# Patient Record
Sex: FEMALE | Race: WHITE | HISPANIC OR LATINO | Employment: FULL TIME | URBAN - METROPOLITAN AREA
[De-identification: names, ages, dates, MRNs, and addresses within clinical notes are randomized per-mention and may not be internally consistent; named-entity substitution may affect disease eponyms.]

---

## 2018-10-09 ENCOUNTER — CONVERSION ENCOUNTER (OUTPATIENT)
Dept: MAMMOGRAPHY | Facility: HOSPITAL | Age: 53
End: 2018-10-09

## 2019-06-22 ENCOUNTER — HOSPITAL ENCOUNTER (OUTPATIENT)
Dept: OTHER | Facility: HOSPITAL | Age: 54
Discharge: HOME | End: 2019-06-22
Attending: PHYSICIAN ASSISTANT

## 2019-06-22 LAB
25(OH)D3 SERPL-MCNC: 37.8 NG/ML (ref 30–100)
ALBUMIN SERPL-MCNC: 4.1 G/DL (ref 3.5–5)
ALBUMIN/GLOB SERPL: 1.4 {RATIO} (ref 1.4–2.6)
ALP SERPL-CCNC: 78 U/L (ref 53–141)
ALT SERPL-CCNC: 20 U/L (ref 10–40)
ANION GAP SERPL CALC-SCNC: 16 MMOL/L (ref 8–19)
AST SERPL-CCNC: 15 U/L (ref 15–50)
BASOPHILS # BLD AUTO: 0.03 10*3/UL (ref 0–0.2)
BASOPHILS NFR BLD AUTO: 0.3 % (ref 0–3)
BILIRUB SERPL-MCNC: 0.6 MG/DL (ref 0.2–1.3)
BUN SERPL-MCNC: 15 MG/DL (ref 5–25)
BUN/CREAT SERPL: 21 {RATIO} (ref 6–20)
CALCIUM SERPL-MCNC: 8.8 MG/DL (ref 8.7–10.4)
CHLORIDE SERPL-SCNC: 104 MMOL/L (ref 99–111)
CHOLEST SERPL-MCNC: 131 MG/DL (ref 107–200)
CHOLEST/HDLC SERPL: 2.5 {RATIO} (ref 3–6)
CONV ABS IMM GRAN: 0.05 10*3/UL (ref 0–0.2)
CONV CO2: 23 MMOL/L (ref 22–32)
CONV IMMATURE GRAN: 0.5 % (ref 0–1.8)
CONV TOTAL PROTEIN: 7 G/DL (ref 6.3–8.2)
CREAT UR-MCNC: 0.7 MG/DL (ref 0.5–0.9)
DEPRECATED RDW RBC AUTO: 42.3 FL (ref 36.4–46.3)
EOSINOPHIL # BLD AUTO: 0 % (ref 0–7)
EOSINOPHIL # BLD AUTO: 0 10*3/UL (ref 0–0.7)
ERYTHROCYTE [DISTWIDTH] IN BLOOD BY AUTOMATED COUNT: 13.7 % (ref 11.7–14.4)
GFR SERPLBLD BASED ON 1.73 SQ M-ARVRAT: >60 ML/MIN/{1.73_M2}
GLOBULIN UR ELPH-MCNC: 2.9 G/DL (ref 2–3.5)
GLUCOSE SERPL-MCNC: 103 MG/DL (ref 65–99)
HBA1C MFR BLD: 12.7 G/DL (ref 12–16)
HCT VFR BLD AUTO: 39.3 % (ref 37–47)
HDLC SERPL-MCNC: 52 MG/DL (ref 40–60)
LDLC SERPL CALC-MCNC: 59 MG/DL (ref 70–100)
LYMPHOCYTES # BLD AUTO: 2.33 10*3/UL (ref 1–5)
MCH RBC QN AUTO: 27.3 PG (ref 27–31)
MCHC RBC AUTO-ENTMCNC: 32.3 G/DL (ref 33–37)
MCV RBC AUTO: 84.3 FL (ref 81–99)
MONOCYTES # BLD AUTO: 0.88 10*3/UL (ref 0.2–1.2)
MONOCYTES NFR BLD AUTO: 9.4 % (ref 3–10)
NEUTROPHILS # BLD AUTO: 6.05 10*3/UL (ref 2–8)
NEUTROPHILS NFR BLD AUTO: 64.9 % (ref 30–85)
NRBC CBCN: 0 % (ref 0–0.7)
OSMOLALITY SERPL CALC.SUM OF ELEC: 289 MOSM/KG (ref 273–304)
PLATELET # BLD AUTO: 260 10*3/UL (ref 130–400)
PMV BLD AUTO: 10.2 FL (ref 9.4–12.3)
POTASSIUM SERPL-SCNC: 4.2 MMOL/L (ref 3.5–5.3)
RBC # BLD AUTO: 4.66 10*6/UL (ref 4.2–5.4)
SODIUM SERPL-SCNC: 139 MMOL/L (ref 135–147)
T4 FREE SERPL-MCNC: 1.2 NG/DL (ref 0.9–1.8)
TRIGL SERPL-MCNC: 99 MG/DL (ref 40–150)
TSH SERPL-ACNC: 0.8 M[IU]/L (ref 0.27–4.2)
VARIANT LYMPHS NFR BLD MANUAL: 24.9 % (ref 20–45)
VLDLC SERPL-MCNC: 20 MG/DL (ref 5–37)
WBC # BLD AUTO: 9.34 10*3/UL (ref 4.8–10.8)

## 2019-10-15 ENCOUNTER — HOSPITAL ENCOUNTER (OUTPATIENT)
Dept: MAMMOGRAPHY | Facility: HOSPITAL | Age: 54
Discharge: HOME | End: 2019-10-15
Attending: PHYSICIAN ASSISTANT

## 2020-12-09 ENCOUNTER — HOSPITAL ENCOUNTER (OUTPATIENT)
Dept: URGENT CARE | Facility: CLINIC | Age: 55
Discharge: HOME | End: 2020-12-09
Attending: EMERGENCY MEDICINE

## 2020-12-10 LAB — SARS-COV-2 RNA SPEC QL NAA+PROBE: NOT DETECTED

## 2021-12-06 ENCOUNTER — TELEPHONE (OUTPATIENT)
Dept: INTERNAL MEDICINE | Facility: CLINIC | Age: 56
End: 2021-12-06

## 2021-12-06 NOTE — PROGRESS NOTES
Chief Complaint  Follow-up (f/u from [facility], pt has varicose veins she would liek you to look at. )  Subjective        History of Present Illness  Carine Tobin is a 56 y.o. female who presents to [facility] for follow-up of hypertension, hyperlipidemia, anxiety disorder, GERD, vitamin D deficiency, and impaired fasting glucose.  The patient is not having any medication side effects. Denies chest pain, shortness of air, abdominal pain, or change in bowel habits. Admits to mild swelling in bilateral ankles. She is complaining of varicose veins in both legs. She denies leg pain.    Medical Notes: Status post CVA uncontrolled hypertension (possible acute lacunar infarct right thalamus per MRI brain) 2014 and chronic numbness to left cheek. Multinodular thyroid gland status post biopsy revealing benign disease per Dr. Pruitt who has retired, needs thyroid ultrasound for nodules every 6 months through [facility]. Followed by Dr. Monet for Crohn's disease that is uncomplicated; colonoscopy done 2-20 17 in needs repeat in 7 to 10 years. Has seen Dr. Timmons (cardiology). History of iron deficiency anemia, zinc deficiency, impaired fasting glucose, and lymphadenopathy cervical chain and ultrasound done.    Medical History:  Diagnosis  Date  Age Comment Src. PL   Anemia        Anxiety and depression        Anxiety disorder, unspecified        Bronchitis        Bulging lumbar disc        Crohn's disease (HCC)        Crohn's disease of small intestine without complication (HCC)        Crohn's disease of small intestine without complication (HCC)        DJD (degenerative joint disease)        Essential (primary) hypertension        Eustachian tube dysfunction        Fibroma of tongue        GERD (gastroesophageal reflux disease)        Hepatic cyst        Hyperlipidemia, unspecified        Impaired fasting glucose        Insomnia        Internal hemorrhoids        Multiple  "thyroid nodules        Nephrolithiasis        Other cerebrovascular disease        Palpitations        Protrusion of lumbar intervertebral disc   L5-S1     Sinusitis        Vitamin D deficiency, unspecified            Surgical History:  Procedure  Laterality Date  Age Comment Src.    COLONOSCOPY  02/01/2017 51y      ENDOSCOPY AND COLONOSCOPY  10/16/2007 41y      HYSTERECTOMY  03/2019 53y PARTIAL     OTHER SURGICAL HISTORY    FNA OF THYROID     THYROID SURGERY                 Current Outpatient Medications:   •  amLODIPine (NORVASC) 5 MG tablet, Take 1 tablet by mouth Daily., Disp: 90 tablet, Rfl: 1  •  aspirin 81 MG EC tablet, Take 81 mg by mouth Daily., Disp: , Rfl:   •  atenolol (TENORMIN) 25 MG tablet, Take 1 tablet by mouth Daily., Disp: 90 tablet, Rfl: 1  •  atorvastatin (LIPITOR) 40 MG tablet, , Disp: , Rfl:   •  buPROPion XL (WELLBUTRIN XL) 150 MG 24 hr tablet, Take 1 tablet by mouth Daily., Disp: 90 tablet, Rfl: 1  •  CALCIUM CARBONATE-VIT D-MIN PO, Take 1 tablet by mouth Daily., Disp: , Rfl:   •  multivitamin with minerals (Vitamins/Minerals) tablet tablet, Take 1 tablet by mouth Daily., Disp: , Rfl:   •  olmesartan-hydrochlorothiazide (BENICAR HCT) 40-25 MG per tablet, Take 1 tablet by mouth Daily., Disp: 90 tablet, Rfl: 1  •  pantoprazole (PROTONIX) 40 MG EC tablet, Take 1 tablet by mouth Daily., Disp: 90 tablet, Rfl: 1  •  vitamin D (ERGOCALCIFEROL) 1.25 MG (67597 UT) capsule capsule, Take 1 capsule by mouth Every 7 (Seven) Days., Disp: 12 capsule, Rfl: 0  Objective   /83 (BP Location: Right arm, Patient Position: Sitting, Cuff Size: Adult)   Pulse 74   Temp 97.4 °F (36.3 °C) (Temporal)   Ht 175.3 cm (69\")   Wt 119 kg (261 lb 9.6 oz)   SpO2 96%   BMI 38.63 kg/m²    Estimated body mass index is 38.63 kg/m² as calculated from the following:    Height as of this encounter: 175.3 cm (69\").    Weight as of this encounter: 119 kg (261 lb 9.6 oz).   Physical Exam  Vitals reviewed.   Constitutional:  "      General: She is not in acute distress.     Appearance: Normal appearance.   HENT:      Head: Normocephalic and atraumatic.   Eyes:      Conjunctiva/sclera: Conjunctivae normal.   Neck:      Comments: No thyroid enlargement  Cardiovascular:      Rate and Rhythm: Normal rate and regular rhythm.      Heart sounds: Normal heart sounds.   Pulmonary:      Effort: Pulmonary effort is normal.      Breath sounds: Normal breath sounds. No wheezing, rhonchi or rales.   Musculoskeletal:      Cervical back: Neck supple. No tenderness.      Right lower leg: Edema present.      Left lower leg: Edema present.      Comments: +1 LE pitting edema and ankle edema bilateral and varicose veins bilateral    Lymphadenopathy:      Cervical: No cervical adenopathy.   Skin:     General: Skin is warm and dry.   Neurological:      General: No focal deficit present.      Mental Status: She is alert.   Psychiatric:         Mood and Affect: Mood normal.         Behavior: Behavior normal.         Thought Content: Thought content normal.         Judgment: Judgment normal.        Result Review :                   Assessment and Plan    Diagnoses and all orders for this visit:    1. Primary hypertension (Primary)  Comments:  Blood pressure is good before stopping medication.  Stable on medication to continue.  Orders:  -     olmesartan-hydrochlorothiazide (BENICAR HCT) 40-25 MG per tablet; Take 1 tablet by mouth Daily.  Dispense: 90 tablet; Refill: 1  -     Comprehensive Metabolic Panel; Future  -     CBC & Differential; Future  -     atenolol (TENORMIN) 25 MG tablet; Take 1 tablet by mouth Daily.  Dispense: 90 tablet; Refill: 1  -     amLODIPine (NORVASC) 5 MG tablet; Take 1 tablet by mouth Daily.  Dispense: 90 tablet; Refill: 1    2. Hyperlipidemia, unspecified hyperlipidemia type  Comments:  Stable on medication and will fasting check labs.  Follow-up by phone.  Orders:  -     Lipid Panel; Future    3. Anxiety disorder, unspecified  type  Comments:  Stable on medication and to continue current treatment plan.  Orders:  -     buPROPion XL (WELLBUTRIN XL) 150 MG 24 hr tablet; Take 1 tablet by mouth Daily.  Dispense: 90 tablet; Refill: 1  -     T4, Free; Future  -     TSH; Future    4. Gastroesophageal reflux disease, unspecified whether esophagitis present  Comments:  Stable on medication continue current treatment plan.  Orders:  -     pantoprazole (PROTONIX) 40 MG EC tablet; Take 1 tablet by mouth Daily.  Dispense: 90 tablet; Refill: 1    5. Varicose veins of both lower extremities, unspecified whether complicated  Comments:  Recommended compression stockings for both fluid and veins. Needs referral to plastic surgeon; patient declines.  Overview:  Recommended compression stockings for both fluid and veins. Needs referral to plastic surgeon; patient declines.      6. Vitamin D deficiency  Comments:  Has been on weekly vitamin D 50,000 units.  Will check level and follow-up by phone.  Orders:  -     vitamin D (ERGOCALCIFEROL) 1.25 MG (36450 UT) capsule capsule; Take 1 capsule by mouth Every 7 (Seven) Days.  Dispense: 12 capsule; Refill: 0  -     Vitamin D 25 Hydroxy; Future    7. Impaired fasting glucose  Comments:  Will monitor.  Orders:  -     Hemoglobin A1c; Future    8. Encounter for screening mammogram for malignant neoplasm of breast  -     Mammo Screening Bilateral With CAD; Future    9. Annual physical exam  Comments:  Labs for wellness exam ordered for 6 months.  Orders:  -     Comprehensive Metabolic Panel; Future  -     CBC & Differential; Future  -     Lipid Panel; Future  -     T4, Free; Future  -     TSH; Future  -     Vitamin D 25 Hydroxy; Future  -     Hemoglobin A1c; Future    Follow Up     Patient was given instructions and counseling regarding her condition or for health maintenance advice. Please see specific information pulled into the AVS if appropriate.   Return in about 6 months (around 6/7/2022) for Annual  physical.    Xenia Carpenter, APRN

## 2021-12-07 ENCOUNTER — OFFICE VISIT (OUTPATIENT)
Dept: INTERNAL MEDICINE | Facility: CLINIC | Age: 56
End: 2021-12-07

## 2021-12-07 ENCOUNTER — LAB (OUTPATIENT)
Dept: LAB | Facility: HOSPITAL | Age: 56
End: 2021-12-07

## 2021-12-07 VITALS
OXYGEN SATURATION: 96 % | TEMPERATURE: 97.4 F | BODY MASS INDEX: 38.75 KG/M2 | DIASTOLIC BLOOD PRESSURE: 83 MMHG | HEART RATE: 74 BPM | HEIGHT: 69 IN | SYSTOLIC BLOOD PRESSURE: 145 MMHG | WEIGHT: 261.6 LBS

## 2021-12-07 DIAGNOSIS — Z00.00 ANNUAL PHYSICAL EXAM: ICD-10-CM

## 2021-12-07 DIAGNOSIS — K21.9 GASTROESOPHAGEAL REFLUX DISEASE, UNSPECIFIED WHETHER ESOPHAGITIS PRESENT: Chronic | ICD-10-CM

## 2021-12-07 DIAGNOSIS — E78.5 HYPERLIPIDEMIA, UNSPECIFIED HYPERLIPIDEMIA TYPE: Chronic | ICD-10-CM

## 2021-12-07 DIAGNOSIS — E55.9 VITAMIN D DEFICIENCY: ICD-10-CM

## 2021-12-07 DIAGNOSIS — I10 PRIMARY HYPERTENSION: Primary | Chronic | ICD-10-CM

## 2021-12-07 DIAGNOSIS — R73.01 IMPAIRED FASTING GLUCOSE: ICD-10-CM

## 2021-12-07 DIAGNOSIS — I83.93 VARICOSE VEINS OF BOTH LOWER EXTREMITIES, UNSPECIFIED WHETHER COMPLICATED: Chronic | ICD-10-CM

## 2021-12-07 DIAGNOSIS — Z12.31 ENCOUNTER FOR SCREENING MAMMOGRAM FOR MALIGNANT NEOPLASM OF BREAST: ICD-10-CM

## 2021-12-07 DIAGNOSIS — F41.9 ANXIETY DISORDER, UNSPECIFIED TYPE: Chronic | ICD-10-CM

## 2021-12-07 DIAGNOSIS — I10 PRIMARY HYPERTENSION: ICD-10-CM

## 2021-12-07 PROBLEM — M25.50 MULTIPLE JOINT PAIN: Status: ACTIVE | Noted: 2017-06-21

## 2021-12-07 PROBLEM — E66.1 DRUG-INDUCED OBESITY: Status: ACTIVE | Noted: 2019-02-28

## 2021-12-07 LAB
25(OH)D3 SERPL-MCNC: 33.7 NG/ML (ref 30–100)
ALBUMIN SERPL-MCNC: 4 G/DL (ref 3.5–5.2)
ALBUMIN/GLOB SERPL: 1.3 G/DL
ALP SERPL-CCNC: 78 U/L (ref 39–117)
ALT SERPL W P-5'-P-CCNC: 23 U/L (ref 1–33)
ANION GAP SERPL CALCULATED.3IONS-SCNC: 11 MMOL/L (ref 5–15)
AST SERPL-CCNC: 19 U/L (ref 1–32)
BASOPHILS # BLD AUTO: 0.04 10*3/MM3 (ref 0–0.2)
BASOPHILS NFR BLD AUTO: 0.5 % (ref 0–1.5)
BILIRUB SERPL-MCNC: 0.4 MG/DL (ref 0–1.2)
BUN SERPL-MCNC: 15 MG/DL (ref 6–20)
BUN/CREAT SERPL: 22.1 (ref 7–25)
CALCIUM SPEC-SCNC: 9.1 MG/DL (ref 8.6–10.5)
CHLORIDE SERPL-SCNC: 108 MMOL/L (ref 98–107)
CHOLEST SERPL-MCNC: 135 MG/DL (ref 0–200)
CO2 SERPL-SCNC: 23 MMOL/L (ref 22–29)
CREAT SERPL-MCNC: 0.68 MG/DL (ref 0.57–1)
DEPRECATED RDW RBC AUTO: 37.5 FL (ref 37–54)
EOSINOPHIL # BLD AUTO: 0 10*3/MM3 (ref 0–0.4)
EOSINOPHIL NFR BLD AUTO: 0 % (ref 0.3–6.2)
ERYTHROCYTE [DISTWIDTH] IN BLOOD BY AUTOMATED COUNT: 13.2 % (ref 12.3–15.4)
GFR SERPL CREATININE-BSD FRML MDRD: 109 ML/MIN/1.73
GFR SERPL CREATININE-BSD FRML MDRD: 90 ML/MIN/1.73
GLOBULIN UR ELPH-MCNC: 3.1 GM/DL
GLUCOSE SERPL-MCNC: 107 MG/DL (ref 65–99)
HBA1C MFR BLD: 5.98 % (ref 4.8–5.6)
HCT VFR BLD AUTO: 36.9 % (ref 34–46.6)
HDLC SERPL-MCNC: 47 MG/DL (ref 40–60)
HGB BLD-MCNC: 12.5 G/DL (ref 12–15.9)
IMM GRANULOCYTES # BLD AUTO: 0.03 10*3/MM3 (ref 0–0.05)
IMM GRANULOCYTES NFR BLD AUTO: 0.4 % (ref 0–0.5)
LDLC SERPL CALC-MCNC: 71 MG/DL (ref 0–100)
LDLC/HDLC SERPL: 1.49 {RATIO}
LYMPHOCYTES # BLD AUTO: 1.63 10*3/MM3 (ref 0.7–3.1)
LYMPHOCYTES NFR BLD AUTO: 20.6 % (ref 19.6–45.3)
MCH RBC QN AUTO: 26.8 PG (ref 26.6–33)
MCHC RBC AUTO-ENTMCNC: 33.9 G/DL (ref 31.5–35.7)
MCV RBC AUTO: 79.2 FL (ref 79–97)
MONOCYTES # BLD AUTO: 0.66 10*3/MM3 (ref 0.1–0.9)
MONOCYTES NFR BLD AUTO: 8.3 % (ref 5–12)
NEUTROPHILS NFR BLD AUTO: 5.55 10*3/MM3 (ref 1.7–7)
NEUTROPHILS NFR BLD AUTO: 70.2 % (ref 42.7–76)
NRBC BLD AUTO-RTO: 0 /100 WBC (ref 0–0.2)
PLATELET # BLD AUTO: 265 10*3/MM3 (ref 140–450)
PMV BLD AUTO: 10.7 FL (ref 6–12)
POTASSIUM SERPL-SCNC: 4.2 MMOL/L (ref 3.5–5.2)
PROT SERPL-MCNC: 7.1 G/DL (ref 6–8.5)
RBC # BLD AUTO: 4.66 10*6/MM3 (ref 3.77–5.28)
SODIUM SERPL-SCNC: 142 MMOL/L (ref 136–145)
T4 FREE SERPL-MCNC: 1.12 NG/DL (ref 0.93–1.7)
TRIGL SERPL-MCNC: 90 MG/DL (ref 0–150)
TSH SERPL DL<=0.05 MIU/L-ACNC: 0.69 UIU/ML (ref 0.27–4.2)
VLDLC SERPL-MCNC: 17 MG/DL (ref 5–40)
WBC NRBC COR # BLD: 7.91 10*3/MM3 (ref 3.4–10.8)

## 2021-12-07 PROCEDURE — 84439 ASSAY OF FREE THYROXINE: CPT

## 2021-12-07 PROCEDURE — 80053 COMPREHEN METABOLIC PANEL: CPT

## 2021-12-07 PROCEDURE — 83036 HEMOGLOBIN GLYCOSYLATED A1C: CPT

## 2021-12-07 PROCEDURE — 84443 ASSAY THYROID STIM HORMONE: CPT

## 2021-12-07 PROCEDURE — 36415 COLL VENOUS BLD VENIPUNCTURE: CPT

## 2021-12-07 PROCEDURE — 99214 OFFICE O/P EST MOD 30 MIN: CPT | Performed by: NURSE PRACTITIONER

## 2021-12-07 PROCEDURE — 80061 LIPID PANEL: CPT

## 2021-12-07 PROCEDURE — 82306 VITAMIN D 25 HYDROXY: CPT

## 2021-12-07 PROCEDURE — 85025 COMPLETE CBC W/AUTO DIFF WBC: CPT

## 2021-12-07 RX ORDER — BUPROPION HYDROCHLORIDE 150 MG/1
1 TABLET ORAL DAILY
COMMUNITY
End: 2021-12-07 | Stop reason: SDUPTHER

## 2021-12-07 RX ORDER — ATENOLOL 25 MG/1
25 TABLET ORAL DAILY
Qty: 90 TABLET | Refills: 1 | Status: SHIPPED | OUTPATIENT
Start: 2021-12-07 | End: 2022-05-23

## 2021-12-07 RX ORDER — PANTOPRAZOLE SODIUM 40 MG/1
40 GRANULE, DELAYED RELEASE ORAL DAILY
COMMUNITY
End: 2021-12-07

## 2021-12-07 RX ORDER — ASPIRIN 81 MG/1
81 TABLET ORAL DAILY
COMMUNITY

## 2021-12-07 RX ORDER — PANTOPRAZOLE SODIUM 40 MG/1
40 TABLET, DELAYED RELEASE ORAL DAILY
COMMUNITY
End: 2021-12-07 | Stop reason: SDUPTHER

## 2021-12-07 RX ORDER — PANTOPRAZOLE SODIUM 40 MG/1
40 TABLET, DELAYED RELEASE ORAL DAILY
Qty: 90 TABLET | Refills: 1 | Status: SHIPPED | OUTPATIENT
Start: 2021-12-07 | End: 2022-04-04 | Stop reason: SDUPTHER

## 2021-12-07 RX ORDER — PANTOPRAZOLE SODIUM 40 MG/1
40 GRANULE, DELAYED RELEASE ORAL DAILY
Qty: 90 EACH | Refills: 0 | Status: CANCELLED | OUTPATIENT
Start: 2021-12-07

## 2021-12-07 RX ORDER — ERGOCALCIFEROL 1.25 MG/1
CAPSULE ORAL
COMMUNITY
Start: 2021-09-24 | End: 2021-12-07 | Stop reason: SDUPTHER

## 2021-12-07 RX ORDER — ATORVASTATIN CALCIUM 40 MG/1
40 TABLET, FILM COATED ORAL DAILY
COMMUNITY
Start: 2021-11-12 | End: 2022-06-28 | Stop reason: SDUPTHER

## 2021-12-07 RX ORDER — OLMESARTAN MEDOXOMIL AND HYDROCHLOROTHIAZIDE 40/25 40; 25 MG/1; MG/1
1 TABLET ORAL DAILY
COMMUNITY
End: 2021-12-07 | Stop reason: SDUPTHER

## 2021-12-07 RX ORDER — BUPROPION HYDROCHLORIDE 150 MG/1
150 TABLET ORAL DAILY
Qty: 90 TABLET | Refills: 1 | Status: SHIPPED | OUTPATIENT
Start: 2021-12-07 | End: 2022-05-23

## 2021-12-07 RX ORDER — ERGOCALCIFEROL 1.25 MG/1
50000 CAPSULE ORAL
Qty: 12 CAPSULE | Refills: 0 | Status: SHIPPED | OUTPATIENT
Start: 2021-12-07 | End: 2022-06-28 | Stop reason: SDUPTHER

## 2021-12-07 RX ORDER — AMLODIPINE BESYLATE 5 MG/1
1 TABLET ORAL DAILY
COMMUNITY
End: 2021-12-07 | Stop reason: SDUPTHER

## 2021-12-07 RX ORDER — MULTIPLE VITAMINS W/ MINERALS TAB 9MG-400MCG
1 TAB ORAL DAILY
COMMUNITY

## 2021-12-07 RX ORDER — AMLODIPINE BESYLATE 5 MG/1
5 TABLET ORAL DAILY
Qty: 90 TABLET | Refills: 1 | Status: SHIPPED | OUTPATIENT
Start: 2021-12-07 | End: 2022-05-23

## 2021-12-07 RX ORDER — OLMESARTAN MEDOXOMIL AND HYDROCHLOROTHIAZIDE 40/25 40; 25 MG/1; MG/1
1 TABLET ORAL DAILY
Qty: 90 TABLET | Refills: 1 | Status: SHIPPED | OUTPATIENT
Start: 2021-12-07 | End: 2022-06-01

## 2021-12-07 RX ORDER — ATENOLOL 25 MG/1
1 TABLET ORAL DAILY
COMMUNITY
End: 2021-12-07 | Stop reason: SDUPTHER

## 2021-12-10 ENCOUNTER — TRANSCRIBE ORDERS (OUTPATIENT)
Dept: ADMINISTRATIVE | Facility: HOSPITAL | Age: 56
End: 2021-12-10

## 2021-12-10 DIAGNOSIS — Z12.31 VISIT FOR SCREENING MAMMOGRAM: Primary | ICD-10-CM

## 2022-01-19 ENCOUNTER — OFFICE VISIT (OUTPATIENT)
Dept: INTERNAL MEDICINE | Facility: CLINIC | Age: 57
End: 2022-01-19

## 2022-01-19 ENCOUNTER — LAB (OUTPATIENT)
Dept: LAB | Facility: HOSPITAL | Age: 57
End: 2022-01-19

## 2022-01-19 VITALS
BODY MASS INDEX: 38.51 KG/M2 | WEIGHT: 260 LBS | SYSTOLIC BLOOD PRESSURE: 114 MMHG | HEIGHT: 69 IN | DIASTOLIC BLOOD PRESSURE: 65 MMHG | TEMPERATURE: 98.7 F | HEART RATE: 78 BPM | OXYGEN SATURATION: 97 %

## 2022-01-19 DIAGNOSIS — Z20.822 EXPOSURE TO CONFIRMED CASE OF COVID-19: Primary | ICD-10-CM

## 2022-01-19 DIAGNOSIS — Z20.822 EXPOSURE TO CONFIRMED CASE OF COVID-19: ICD-10-CM

## 2022-01-19 PROCEDURE — 99213 OFFICE O/P EST LOW 20 MIN: CPT | Performed by: NURSE PRACTITIONER

## 2022-01-19 PROCEDURE — U0004 COV-19 TEST NON-CDC HGH THRU: HCPCS

## 2022-01-19 NOTE — PROGRESS NOTES
Chief Complaint  covid exposure (exposed, no symptoms. )  Subjective        History of Present Illness  Carine Tobin is a 56 y.o. female who presents to [facility] for complaint of possible covid. She and her family were exposed to a postive covid at a  4 days ago. Asymptomatic.     Past Medical History:   Diagnosis Date   • Anemia    • Anxiety and depression    • Anxiety disorder, unspecified    • Bronchitis    • Bulging lumbar disc    • Crohn's disease (HCC)    • Crohn's disease of small intestine without complication (HCC)    • Crohn's disease of small intestine without complication (HCC)    • DJD (degenerative joint disease)    • Essential (primary) hypertension    • Eustachian tube dysfunction    • Fibroma of tongue    • GERD (gastroesophageal reflux disease)    • Hepatic cyst    • Hyperlipidemia, unspecified    • Impaired fasting glucose    • Insomnia    • Internal hemorrhoids    • Multiple thyroid nodules    • Nephrolithiasis    • Other cerebrovascular disease    • Palpitations    • Protrusion of lumbar intervertebral disc     L5-S1   • Sinusitis    • Vitamin D deficiency, unspecified    • Zinc deficiency 2012        Past Surgical History:   Procedure Laterality Date   • COLONOSCOPY  2017   • ENDOSCOPY AND COLONOSCOPY  10/16/2007   • HYSTERECTOMY  2019    PARTIAL   • OTHER SURGICAL HISTORY      FNA OF THYROID   • THYROID SURGERY      FNA OF THYROID        Allergies   Allergen Reactions   • Amoxicillin Hives and Unknown - Low Severity   • Levofloxacin Nausea And Vomiting, Nausea Only and Unknown - Low Severity          Current Outpatient Medications:   •  amLODIPine (NORVASC) 5 MG tablet, Take 1 tablet by mouth Daily., Disp: 90 tablet, Rfl: 1  •  aspirin 81 MG EC tablet, Take 81 mg by mouth Daily., Disp: , Rfl:   •  atenolol (TENORMIN) 25 MG tablet, Take 1 tablet by mouth Daily., Disp: 90 tablet, Rfl: 1  •  atorvastatin (LIPITOR) 40 MG tablet, , Disp: ,  "Rfl:   •  buPROPion XL (WELLBUTRIN XL) 150 MG 24 hr tablet, Take 1 tablet by mouth Daily., Disp: 90 tablet, Rfl: 1  •  CALCIUM CARBONATE-VIT D-MIN PO, Take 1 tablet by mouth Daily., Disp: , Rfl:   •  multivitamin with minerals (Vitamins/Minerals) tablet tablet, Take 1 tablet by mouth Daily., Disp: , Rfl:   •  olmesartan-hydrochlorothiazide (BENICAR HCT) 40-25 MG per tablet, Take 1 tablet by mouth Daily., Disp: 90 tablet, Rfl: 1  •  pantoprazole (PROTONIX) 40 MG EC tablet, Take 1 tablet by mouth Daily., Disp: 90 tablet, Rfl: 1  •  vitamin D (ERGOCALCIFEROL) 1.25 MG (37173 UT) capsule capsule, Take 1 capsule by mouth Every 7 (Seven) Days., Disp: 12 capsule, Rfl: 0    Objective   /65 (BP Location: Left arm, Patient Position: Sitting, Cuff Size: Adult)   Pulse 78   Temp 98.7 °F (37.1 °C) (Temporal)   Ht 175.3 cm (69\")   Wt 118 kg (260 lb)   SpO2 97%   BMI 38.40 kg/m²    Estimated body mass index is 38.4 kg/m² as calculated from the following:    Height as of this encounter: 175.3 cm (69\").    Weight as of this encounter: 118 kg (260 lb).   Physical Exam  Vitals reviewed.   Constitutional:       General: She is not in acute distress.     Appearance: Normal appearance.   Eyes:      Conjunctiva/sclera: Conjunctivae normal.   Cardiovascular:      Rate and Rhythm: Normal rate and regular rhythm.      Heart sounds: Normal heart sounds.   Pulmonary:      Effort: Pulmonary effort is normal.      Breath sounds: Normal breath sounds. No wheezing, rhonchi or rales.   Neurological:      General: No focal deficit present.      Mental Status: She is alert.   Psychiatric:         Mood and Affect: Mood normal.         Behavior: Behavior normal.         Thought Content: Thought content normal.         Judgment: Judgment normal.          Assessment and Plan    Diagnoses and all orders for this visit:    1. Exposure to confirmed case of COVID-19 (Primary)  Comments:  Over the counter medication if symptoms develop. Education " provided. Follow up test results by phone.  Orders:  -     COVID-19,CEPHEID/PREMA,COR/DEANNE/PAD/MABLE IN-HOUSE(OR EMERGENT/ADD-ON),NP SWAB IN TRANSPORT MEDIA 3-4 HR TAT, RT-PCR - Swab, Nasopharynx; Future      Follow Up     Patient was given instructions and counseling regarding her condition. Please see specific information pulled into the AVS if appropriate.   Return for Follow-up post test by phone.    JENNYFER Plascencia

## 2022-01-20 LAB — SARS-COV-2 RNA PNL SPEC NAA+PROBE: NOT DETECTED

## 2022-01-23 NOTE — PATIENT INSTRUCTIONS
"COVID-19: Quarantine vs. Isolation  QUARANTINE keeps someone who was in close contact with someone who has COVID-19 away from others.  If you had close contact with a person who has COVID-19  · The best way to protect yourself and others is to stay home for 14 days after your last contact. Check your local health department's website for information about options in your area to possibly shorten this quarantine period.  · Check your temperature twice a day and watch for symptoms of COVID-19.  · If possible, stay away from people who are at higher-risk for getting very sick from COVID-19.  ISOLATION keeps someone who is sick or tested positive for COVID-19 without symptoms away from others, even in their own home.  If you are sick and think or know you have COVID-19  · Stay home until after  ? At least 10 days since symptoms first appeared and  ? At least 24 hours with no fever without fever-reducing medication and  ? Symptoms have improved  If you tested positive for COVID-19 but do not have symptoms  · Stay home until after  ? 10 days have passed since your positive test  If you live with others, stay in a specific \"sick room\" or area and away from other people or animals, including pets. Use a separate bathroom, if available.  cdc.gov/coronavirus  12/17/2020  This information is not intended to replace advice given to you by your health care provider. Make sure you discuss any questions you have with your health care provider.  Document Revised: 07/13/2021 Document Reviewed: 07/13/2021  [employer] Patient Education © 2021 [employer]  COVID-19: What Your Test Results Mean  If you test positive for COVID-19  Take steps to help prevent the spread of COVID-19  Stay home.   Do not leave your home, except to get medical care. Do not visit public areas.  Get rest and stay hydrated.  Take over-the-counter medicines, such as acetaminophen, to help you feel better.  Stay in touch with your doctor.  Separate yourself from " other people.   As much as possible, stay in a specific room and away from other people and pets in your home.  If you test negative for COVID-19  · You probably were not infected at the time your sample was collected.  · However, that does not mean you will not get sick.  · It is possible that you were very early in your infection when your sample was collected and that you could test positive later.  A negative test result does not mean you won't get sick later.  cdc.gov/coronavirus  05/30/2020  This information is not intended to replace advice given to you by your health care provider. Make sure you discuss any questions you have with your health care provider.  Document Revised: 07/13/2021 Document Reviewed: 07/13/2021  [employer] Patient Education © 2021 [employer] Inc[employer]

## 2022-02-12 ENCOUNTER — HOSPITAL ENCOUNTER (OUTPATIENT)
Dept: MAMMOGRAPHY | Facility: HOSPITAL | Age: 57
Discharge: HOME | End: 2022-02-12
Admitting: NURSE PRACTITIONER

## 2022-02-12 DIAGNOSIS — Z12.31 VISIT FOR SCREENING MAMMOGRAM: ICD-10-CM

## 2022-02-12 PROCEDURE — 77063 BREAST TOMOSYNTHESIS BI: CPT

## 2022-02-12 PROCEDURE — 77067 SCR MAMMO BI INCL CAD: CPT

## 2022-04-04 DIAGNOSIS — K21.9 GASTROESOPHAGEAL REFLUX DISEASE, UNSPECIFIED WHETHER ESOPHAGITIS PRESENT: Chronic | ICD-10-CM

## 2022-04-04 RX ORDER — PANTOPRAZOLE SODIUM 40 MG/1
40 TABLET, DELAYED RELEASE ORAL DAILY
Qty: 90 TABLET | Refills: 1 | Status: SHIPPED | OUTPATIENT
Start: 2022-04-04 | End: 2022-07-06 | Stop reason: SDUPTHER

## 2022-04-04 NOTE — TELEPHONE ENCOUNTER
Caller: Sharmin Tobinta    Relationship: Self    Best call back number: [phone]    Requested Prescriptions:   Requested Prescriptions     Pending Prescriptions Disp Refills   • pantoprazole (PROTONIX) 40 MG EC tablet 90 tablet 1     Sig: Take 1 tablet by mouth Daily.        Pharmacy where request should be sent: [facility] #[id number] - [place], [place] - [address] [facility] [phone]  - [phone] FX     Additional details provided by patient: PATIENT STATES THAT A PRIOR AUTHORIZATION IS NEEDED FOR THIS MEDICATION AND SHE HAS ABOUT A WEEK AND HALF LEFT. REQUESTING 90 DAYS     Does the patient have less than a 3 day supply:  [] Yes  [x] No    Gege Bernal Rep   04/04/22 15:12 EDT

## 2022-05-21 DIAGNOSIS — I10 PRIMARY HYPERTENSION: Chronic | ICD-10-CM

## 2022-05-21 DIAGNOSIS — F41.9 ANXIETY DISORDER, UNSPECIFIED TYPE: Chronic | ICD-10-CM

## 2022-05-23 RX ORDER — ATENOLOL 25 MG/1
TABLET ORAL
Qty: 90 TABLET | Refills: 1 | Status: SHIPPED | OUTPATIENT
Start: 2022-05-23 | End: 2022-06-28 | Stop reason: SDUPTHER

## 2022-05-23 RX ORDER — AMLODIPINE BESYLATE 5 MG/1
TABLET ORAL
Qty: 90 TABLET | Refills: 1 | Status: SHIPPED | OUTPATIENT
Start: 2022-05-23 | End: 2022-06-28

## 2022-05-23 RX ORDER — BUPROPION HYDROCHLORIDE 150 MG/1
TABLET ORAL
Qty: 90 TABLET | Refills: 1 | Status: SHIPPED | OUTPATIENT
Start: 2022-05-23 | End: 2022-06-28 | Stop reason: SDUPTHER

## 2022-06-01 DIAGNOSIS — I10 PRIMARY HYPERTENSION: Chronic | ICD-10-CM

## 2022-06-01 RX ORDER — OLMESARTAN MEDOXOMIL AND HYDROCHLOROTHIAZIDE 40/25 40; 25 MG/1; MG/1
1 TABLET ORAL DAILY
Qty: 90 TABLET | Refills: 1 | Status: SHIPPED | OUTPATIENT
Start: 2022-06-01 | End: 2022-06-28 | Stop reason: SDUPTHER

## 2022-06-28 ENCOUNTER — OFFICE VISIT (OUTPATIENT)
Dept: INTERNAL MEDICINE | Facility: CLINIC | Age: 57
End: 2022-06-28

## 2022-06-28 VITALS
SYSTOLIC BLOOD PRESSURE: 128 MMHG | DIASTOLIC BLOOD PRESSURE: 84 MMHG | BODY MASS INDEX: 38.78 KG/M2 | OXYGEN SATURATION: 98 % | WEIGHT: 261.8 LBS | HEIGHT: 69 IN | HEART RATE: 86 BPM | TEMPERATURE: 98.4 F

## 2022-06-28 DIAGNOSIS — E78.5 HYPERLIPIDEMIA, UNSPECIFIED HYPERLIPIDEMIA TYPE: Chronic | ICD-10-CM

## 2022-06-28 DIAGNOSIS — K21.9 GASTROESOPHAGEAL REFLUX DISEASE, UNSPECIFIED WHETHER ESOPHAGITIS PRESENT: Chronic | ICD-10-CM

## 2022-06-28 DIAGNOSIS — I10 PRIMARY HYPERTENSION: Chronic | ICD-10-CM

## 2022-06-28 DIAGNOSIS — Z00.00 ANNUAL PHYSICAL EXAM: Primary | ICD-10-CM

## 2022-06-28 DIAGNOSIS — Z11.59 NEED FOR HEPATITIS C SCREENING TEST: ICD-10-CM

## 2022-06-28 DIAGNOSIS — E55.9 VITAMIN D DEFICIENCY: ICD-10-CM

## 2022-06-28 DIAGNOSIS — R73.01 IMPAIRED FASTING GLUCOSE: ICD-10-CM

## 2022-06-28 DIAGNOSIS — Z23 NEED FOR SHINGLES VACCINE: ICD-10-CM

## 2022-06-28 DIAGNOSIS — F41.9 ANXIETY DISORDER, UNSPECIFIED TYPE: Chronic | ICD-10-CM

## 2022-06-28 PROCEDURE — 90471 IMMUNIZATION ADMIN: CPT | Performed by: NURSE PRACTITIONER

## 2022-06-28 PROCEDURE — 90750 HZV VACC RECOMBINANT IM: CPT | Performed by: NURSE PRACTITIONER

## 2022-06-28 PROCEDURE — 99396 PREV VISIT EST AGE 40-64: CPT | Performed by: NURSE PRACTITIONER

## 2022-06-28 PROCEDURE — 99214 OFFICE O/P EST MOD 30 MIN: CPT | Performed by: NURSE PRACTITIONER

## 2022-06-28 RX ORDER — AMLODIPINE BESYLATE 5 MG/1
5 TABLET ORAL DAILY
Qty: 90 TABLET | Refills: 1 | Status: SHIPPED | OUTPATIENT
Start: 2022-06-28 | End: 2023-03-31 | Stop reason: SDUPTHER

## 2022-06-28 RX ORDER — OLMESARTAN MEDOXOMIL AND HYDROCHLOROTHIAZIDE 40/25 40; 25 MG/1; MG/1
1 TABLET ORAL DAILY
Qty: 90 TABLET | Refills: 1 | Status: SHIPPED | OUTPATIENT
Start: 2022-06-28

## 2022-06-28 RX ORDER — ATENOLOL 25 MG/1
25 TABLET ORAL DAILY
Qty: 90 TABLET | Refills: 1 | Status: SHIPPED | OUTPATIENT
Start: 2022-06-28 | End: 2023-03-31 | Stop reason: SDUPTHER

## 2022-06-28 RX ORDER — BUPROPION HYDROCHLORIDE 150 MG/1
150 TABLET ORAL DAILY
Qty: 90 TABLET | Refills: 1 | Status: SHIPPED | OUTPATIENT
Start: 2022-06-28

## 2022-06-28 RX ORDER — ATORVASTATIN CALCIUM 40 MG/1
40 TABLET, FILM COATED ORAL DAILY
Qty: 90 TABLET | Refills: 1 | Status: SHIPPED | OUTPATIENT
Start: 2022-06-28

## 2022-06-28 RX ORDER — ERGOCALCIFEROL 1.25 MG/1
50000 CAPSULE ORAL
Qty: 12 CAPSULE | Refills: 1 | Status: SHIPPED | OUTPATIENT
Start: 2022-06-28

## 2022-06-28 NOTE — PROGRESS NOTES
Chief Complaint  Annual Exam (Labs done. Patient did have COVID over memorial day weekend. She has questions about bloodwork. )  Subjective        History of Present Illness  Carine Tobin presents to [facility] for:     1.  Her annual physical exam.     2. Follow up hypertension, hyperlipidemia, anxiety, GERD, vitamin D deficiency, and impaired fasting glucose. No medication side effects. Negative for chest pain, palpitations, shortness of air, dizziness, headaches, or fatigue. Followed by endocrinology (Megan) and gastroenterologist (Tierney);  colonoscopy scheduled 8/15/22. Dexa is scheduled.  Labs from [facility] reviewed.    Current Outpatient Medications:   •  amLODIPine (NORVASC) 5 MG tablet, Take 1 tablet by mouth Daily., Disp: 90 tablet, Rfl: 1  •  aspirin 81 MG EC tablet, Take 81 mg by mouth Daily., Disp: , Rfl:   •  atenolol (TENORMIN) 25 MG tablet, Take 1 tablet by mouth Daily., Disp: 90 tablet, Rfl: 1  •  atorvastatin (LIPITOR) 40 MG tablet, Take 1 tablet by mouth Daily., Disp: 90 tablet, Rfl: 1  •  buPROPion XL (WELLBUTRIN XL) 150 MG 24 hr tablet, Take 1 tablet by mouth Daily., Disp: 90 tablet, Rfl: 1  •  CALCIUM CARBONATE-VIT D-MIN PO, Take 1 tablet by mouth Daily., Disp: , Rfl:   •  multivitamin with minerals tablet tablet, Take 1 tablet by mouth Daily., Disp: , Rfl:   •  olmesartan-hydrochlorothiazide (BENICAR HCT) 40-25 MG per tablet, Take 1 tablet by mouth Daily., Disp: 90 tablet, Rfl: 1  •  pantoprazole (PROTONIX) 40 MG EC tablet, Take 1 tablet by mouth Daily., Disp: 90 tablet, Rfl: 1  •  vitamin D (ERGOCALCIFEROL) 1.25 MG (60985 UT) capsule capsule, Take 1 capsule by mouth Every 7 (Seven) Days., Disp: 12 capsule, Rfl: 1  Allergies   Allergen Reactions   • Amoxicillin Hives and Unknown - Low Severity   • Levofloxacin Nausea And Vomiting, Nausea Only and Unknown - Low Severity      Past Medical History:   Diagnosis Date   • Anemia    • Anxiety and depression   "  • Anxiety disorder, unspecified    • Bronchitis    • Bulging lumbar disc    • Crohn's disease (HCC)    • Crohn's disease of small intestine without complication (HCC)    • Crohn's disease of small intestine without complication (HCC)    • DJD (degenerative joint disease)    • Essential (primary) hypertension    • Eustachian tube dysfunction    • Fibroma of tongue    • GERD (gastroesophageal reflux disease)    • Hepatic cyst    • Hyperlipidemia, unspecified    • Impaired fasting glucose    • Insomnia    • Internal hemorrhoids    • Multiple thyroid nodules    • Nephrolithiasis    • Other cerebrovascular disease    • Palpitations    • Protrusion of lumbar intervertebral disc     L5-S1   • Sinusitis    • Vitamin D deficiency, unspecified    • Zinc deficiency 04/2012      Past Surgical History:   Procedure Laterality Date   • COLONOSCOPY  02/01/2017   • ENDOSCOPY AND COLONOSCOPY  10/16/2007   • HYSTERECTOMY  03/2019    PARTIAL   • OTHER SURGICAL HISTORY      FNA OF THYROID   • THYROID SURGERY      FNA OF THYROID      Objective   /84   Pulse 86   Temp 98.4 °F (36.9 °C) (Temporal)   Ht 175.3 cm (69\")   Wt 119 kg (261 lb 12.8 oz)   SpO2 98%   BMI 38.66 kg/m²    Estimated body mass index is 38.66 kg/m² as calculated from the following:    Height as of this encounter: 175.3 cm (69\").    Weight as of this encounter: 119 kg (261 lb 12.8 oz).   Physical Exam  Vitals reviewed.   Constitutional:       General: She is not in acute distress.     Appearance: Normal appearance.   HENT:      Head: Normocephalic and atraumatic.      Right Ear: Tympanic membrane and ear canal normal.      Left Ear: Tympanic membrane and ear canal normal.   Eyes:      Conjunctiva/sclera: Conjunctivae normal.      Pupils: Pupils are equal, round, and reactive to light.   Cardiovascular:      Rate and Rhythm: Normal rate and regular rhythm.      Heart sounds: Normal heart sounds. No murmur heard.  Pulmonary:      Effort: Pulmonary effort is " normal.      Breath sounds: Normal breath sounds. No wheezing, rhonchi or rales.   Abdominal:      General: Abdomen is flat. There is no distension.      Palpations: Abdomen is soft. There is no mass.      Tenderness: There is no abdominal tenderness.   Musculoskeletal:      Cervical back: Neck supple. No tenderness.      Right lower leg: No edema.      Left lower leg: No edema.   Lymphadenopathy:      Cervical: No cervical adenopathy.   Skin:     General: Skin is warm and dry.      Coloration: Skin is not jaundiced or pale.   Neurological:      General: No focal deficit present.      Mental Status: She is alert.   Psychiatric:         Mood and Affect: Mood normal.         Thought Content: Thought content normal.        Result Review :   The following data was reviewed by: JENNYFER Plascencia on 06/28/2022:    Data reviewed: Radiologic studies Mammo Screening Digital Tomosynthesis Bilateral With CAD (02/12/2022 10:32)          Assessment and Plan    Diagnoses and all orders for this visit:    1. Annual physical exam (Primary)  Comments:  Discussed healthy diet, exercise, adequate sleep, cancer screening, immunizations, and preventative care. Annual eye exam and twice yearly dental cleaning.    2. Primary hypertension  Comments:  Stable on amlodipie 5 mg daily, olmesartan-HCTZ 40-25 mg daily and atenolol 25 mg daily and to continue.  Orders:  -     Comprehensive Metabolic Panel; Future  -     CBC & Differential; Future    3. Hyperlipidemia, unspecified hyperlipidemia type  Comments:  Stable on atorvastatin 40 mg daily and to continue.  Orders:  -     atorvastatin (LIPITOR) 40 MG tablet; Take 1 tablet by mouth Daily.  Dispense: 90 tablet; Refill: 1  -     Lipid Panel; Future  -     T4, Free; Future  -     TSH; Future    4. Anxiety disorder, unspecified type  Comments:  Stable on buporpion  mg daily and to continue.    5. Gastroesophageal reflux disease, unspecified whether esophagitis  present  Comments:  Stable on protonix 40 mg daily and to continue.    6. Impaired fasting glucose  Comments:  HA1c is 5.7. Monitor.  Orders:  -     Hemoglobin A1c; Future    7. Vitamin D deficiency  Comments:  Level is 75. Stable and to continue Vitamin d 50,000 units weekly. Recheck in 6 months.  Orders:  -     Vitamin D 25 Hydroxy; Future    8. Need for shingles vaccine  -     Shingrix Vaccine    9. Need for hepatitis C screening test  -     Hepatitis C Antibody; Future    Other orders  -     buPROPion XL (WELLBUTRIN XL) 150 MG 24 hr tablet; Take 1 tablet by mouth Daily.  Dispense: 90 tablet; Refill: 1  -     olmesartan-hydrochlorothiazide (BENICAR HCT) 40-25 MG per tablet; Take 1 tablet by mouth Daily.  Dispense: 90 tablet; Refill: 1  -     atenolol (TENORMIN) 25 MG tablet; Take 1 tablet by mouth Daily.  Dispense: 90 tablet; Refill: 1  -     amLODIPine (NORVASC) 5 MG tablet; Take 1 tablet by mouth Daily.  Dispense: 90 tablet; Refill: 1  -     vitamin D (ERGOCALCIFEROL) 1.25 MG (95116 UT) capsule capsule; Take 1 capsule by mouth Every 7 (Seven) Days.  Dispense: 12 capsule; Refill: 1        Follow Up     Patient was given instructions and counseling regarding her condition or for health maintenance advice. Please see specific information pulled into the AVS if appropriate.   Return in about 6 months (around 12/28/2022) for Recheck.    JENNYFER Plascencia

## 2022-07-06 ENCOUNTER — TELEPHONE (OUTPATIENT)
Dept: INTERNAL MEDICINE | Facility: CLINIC | Age: 57
End: 2022-07-06

## 2022-07-06 DIAGNOSIS — K21.9 GASTROESOPHAGEAL REFLUX DISEASE, UNSPECIFIED WHETHER ESOPHAGITIS PRESENT: Chronic | ICD-10-CM

## 2022-07-06 RX ORDER — PANTOPRAZOLE SODIUM 40 MG/1
40 TABLET, DELAYED RELEASE ORAL DAILY
Qty: 90 TABLET | Refills: 1 | Status: SHIPPED | OUTPATIENT
Start: 2022-07-06 | End: 2023-02-17 | Stop reason: SDUPTHER

## 2022-07-06 NOTE — TELEPHONE ENCOUNTER
Caller: Carine Tobin    Relationship: Self    Best call back number: [phone]    PATIENT REPORTS PHARMACY INFORMED HER THAT HER pantoprazole (PROTONIX) 40 MG EC tablet - REQUIRES A PRIOR AUTH      Additional information or concerns: PATIENT IS OUT OF MEDICATION

## 2023-02-01 ENCOUNTER — OFFICE VISIT (OUTPATIENT)
Dept: INTERNAL MEDICINE | Facility: CLINIC | Age: 58
End: 2023-02-01
Payer: COMMERCIAL

## 2023-02-01 VITALS
SYSTOLIC BLOOD PRESSURE: 124 MMHG | BODY MASS INDEX: 36.23 KG/M2 | HEART RATE: 80 BPM | WEIGHT: 244.6 LBS | HEIGHT: 69 IN | DIASTOLIC BLOOD PRESSURE: 70 MMHG | TEMPERATURE: 97 F | OXYGEN SATURATION: 98 %

## 2023-02-01 DIAGNOSIS — N39.3 STRESS INCONTINENCE: ICD-10-CM

## 2023-02-01 DIAGNOSIS — J18.9 PNEUMONIA DUE TO INFECTIOUS ORGANISM, UNSPECIFIED LATERALITY, UNSPECIFIED PART OF LUNG: Primary | ICD-10-CM

## 2023-02-01 DIAGNOSIS — R68.82 DECREASED SEXUAL INTEREST: ICD-10-CM

## 2023-02-01 PROCEDURE — 99214 OFFICE O/P EST MOD 30 MIN: CPT | Performed by: NURSE PRACTITIONER

## 2023-02-01 RX ORDER — BENZONATATE 200 MG/1
200 CAPSULE ORAL 3 TIMES DAILY PRN
Qty: 30 CAPSULE | Refills: 0 | Status: SHIPPED | OUTPATIENT
Start: 2023-02-01 | End: 2023-02-17 | Stop reason: SDUPTHER

## 2023-02-01 NOTE — PROGRESS NOTES
Chief Complaint  Pneumonia (Follow up. She went to urgent care on 01/04, they stated she had pneumonia, was given medication. She states she coughs a lot, she states she is sore on the ribs, coughing up yellow mucous. No fever. She states she has had incontinence since December.)  Subjective    History of Present Illness  Carine Tobin is a 57 y.o. female with hypertension, hyperlipidemia, anxiety, prediabetes, Crohn's disease, GERD and cerebrovascular disease presents to [facility] for follow-up of pneumonia.  She was seen by urgent care on 1/4 and again on 1/16 and treated with Zithromax, prednisone, Mucinex DM, cefdinir and benzonatate 100 mg.Taking mucinex DM which has helped and she is breathing better with deeper breathes now. She is still coughing frequently and she has yellowish color productive cough. She is complaining of needing to wear depends due to urine leaking with coughing. Also, asking about checking hormone levels due to decrease sexual interest and if she has went through menopause. She denies hot flashes and night sweats. She had a hysterectomy for fibroids and does not see a GYN because she stopped needing pap smears.     Past Medical History:   Diagnosis Date   • Anemia    • Anxiety and depression    • Anxiety disorder, unspecified    • Bronchitis    • Bulging lumbar disc    • Crohn's disease (HCC)    • Crohn's disease of small intestine without complication (HCC)    • Crohn's disease of small intestine without complication (HCC)    • DJD (degenerative joint disease)    • Essential (primary) hypertension    • Eustachian tube dysfunction    • Fibroma of tongue    • GERD (gastroesophageal reflux disease)    • Hepatic cyst    • Hyperlipidemia, unspecified    • Impaired fasting glucose    • Insomnia    • Internal hemorrhoids    • Multiple thyroid nodules    • Nephrolithiasis    • Other cerebrovascular disease    • Palpitations    • Protrusion of lumbar  intervertebral disc     L5-S1   • Sinusitis    • Vitamin D deficiency, unspecified    • Zinc deficiency 04/2012        Past Surgical History:   Procedure Laterality Date   • COLONOSCOPY  02/01/2017   • ENDOSCOPY AND COLONOSCOPY  10/16/2007   • HYSTERECTOMY  03/2019    PARTIAL   • OTHER SURGICAL HISTORY      FNA OF THYROID   • THYROID SURGERY      FNA OF THYROID        Allergies   Allergen Reactions   • Amoxicillin Hives and Unknown - Low Severity   • Levofloxacin Nausea And Vomiting, Nausea Only and Unknown - Low Severity          Current Outpatient Medications:   •  albuterol sulfate  (90 Base) MCG/ACT inhaler, Inhale 2 puffs Every 4 (Four) Hours As Needed for Wheezing., Disp: 6.7 g, Rfl: 0  •  amLODIPine (NORVASC) 5 MG tablet, Take 1 tablet by mouth Daily., Disp: 90 tablet, Rfl: 1  •  aspirin 81 MG EC tablet, Take 81 mg by mouth Daily., Disp: , Rfl:   •  atenolol (TENORMIN) 25 MG tablet, Take 1 tablet by mouth Daily., Disp: 90 tablet, Rfl: 1  •  atorvastatin (LIPITOR) 40 MG tablet, Take 1 tablet by mouth Daily., Disp: 90 tablet, Rfl: 1  •  buPROPion XL (WELLBUTRIN XL) 150 MG 24 hr tablet, Take 1 tablet by mouth Daily., Disp: 90 tablet, Rfl: 1  •  CALCIUM CARBONATE-VIT D-MIN PO, Take 1 tablet by mouth Daily., Disp: , Rfl:   •  guaifenesin-dextromethorphan (MUCINEX DM)  MG tablet sustained-release 12 hour tablet, Take 2 tablets by mouth 2 (Two) Times a Day As Needed (Cough)., Disp: 40 tablet, Rfl: 0  •  multivitamin with minerals tablet tablet, Take 1 tablet by mouth Daily., Disp: , Rfl:   •  olmesartan-hydrochlorothiazide (BENICAR HCT) 40-25 MG per tablet, Take 1 tablet by mouth Daily., Disp: 90 tablet, Rfl: 1  •  pantoprazole (PROTONIX) 40 MG EC tablet, Take 1 tablet by mouth Daily., Disp: 90 tablet, Rfl: 1  •  vitamin D (ERGOCALCIFEROL) 1.25 MG (93374 UT) capsule capsule, Take 1 capsule by mouth Every 7 (Seven) Days., Disp: 12 capsule, Rfl: 1  •  benzonatate (TESSALON) 200 MG capsule, Take 1 capsule  "by mouth 3 (Three) Times a Day As Needed for Cough., Disp: 30 capsule, Rfl: 0    Objective   /70 (BP Location: Right arm, Patient Position: Sitting, Cuff Size: Large Adult)   Pulse 80   Temp 97 °F (36.1 °C) (Temporal)   Ht 175.3 cm (69\")   Wt 111 kg (244 lb 9.6 oz)   SpO2 98%   BMI 36.12 kg/m²    Estimated body mass index is 36.12 kg/m² as calculated from the following:    Height as of this encounter: 175.3 cm (69\").    Weight as of this encounter: 111 kg (244 lb 9.6 oz).   Physical Exam  Constitutional:       General: She is not in acute distress.  HENT:      Head: Normocephalic and atraumatic.      Right Ear: Tympanic membrane and ear canal normal.      Left Ear: Tympanic membrane and ear canal normal.   Cardiovascular:      Rate and Rhythm: Normal rate and regular rhythm.      Heart sounds: Normal heart sounds.   Pulmonary:      Breath sounds: Examination of the right-middle field reveals wheezing. Examination of the left-middle field reveals wheezing. Wheezing present. No rhonchi or rales.   Lymphadenopathy:      Cervical: No cervical adenopathy.   Neurological:      General: No focal deficit present.      Mental Status: She is alert.        Result Review :  The following data was reviewed by: JENNYFER Plascencia on 02/01/2023:  Influenza A&B    Common Labsle 1/4/23   Rapid Influenza A Ag Negative   Rapid Influenza B Ag Negative               Data reviewed: Radiologic studies XR Chest 2 View (01/16/2023 20:26)            Assessment and Plan   Diagnoses and all orders for this visit:    1. Pneumonia due to infectious organism, unspecified laterality, unspecified part of lung (Primary)  Comments:  Improving. Use Tessalon 200 mg TID prn cough and plain mucinex. Trelegy 200 mcg IH once a day.    2. Stress incontinence  Comments:  Maury discussed; written info provided. Pt will trial this and if not improving agrees to see urology.    3. Decreased sexual interest  Comments:  Post hyst for hx fibroids. " Check hormones with labs.   Orders:  -     Estrogens, Total; Future  -     Testosterone; Future    Other orders  -     benzonatate (TESSALON) 200 MG capsule; Take 1 capsule by mouth 3 (Three) Times a Day As Needed for Cough.  Dispense: 30 capsule; Refill: 0         Patient was given instructions and counseling regarding her condition or for health maintenance advice. Please see specific information pulled into the AVS if appropriate.     Follow Up   Return in 2 weeks (on 2/15/2023) for Recheck.    JENNYFER Plascencia

## 2023-02-04 ENCOUNTER — LAB (OUTPATIENT)
Dept: LAB | Facility: HOSPITAL | Age: 58
End: 2023-02-04
Payer: COMMERCIAL

## 2023-02-04 DIAGNOSIS — R73.01 IMPAIRED FASTING GLUCOSE: ICD-10-CM

## 2023-02-04 DIAGNOSIS — E55.9 VITAMIN D DEFICIENCY: ICD-10-CM

## 2023-02-04 DIAGNOSIS — R68.82 DECREASED SEXUAL INTEREST: ICD-10-CM

## 2023-02-04 DIAGNOSIS — E78.5 HYPERLIPIDEMIA, UNSPECIFIED HYPERLIPIDEMIA TYPE: Chronic | ICD-10-CM

## 2023-02-04 DIAGNOSIS — Z11.59 NEED FOR HEPATITIS C SCREENING TEST: ICD-10-CM

## 2023-02-04 DIAGNOSIS — I10 PRIMARY HYPERTENSION: ICD-10-CM

## 2023-02-04 PROCEDURE — 84439 ASSAY OF FREE THYROXINE: CPT

## 2023-02-04 PROCEDURE — 82672 ASSAY OF ESTROGEN: CPT

## 2023-02-04 PROCEDURE — 82306 VITAMIN D 25 HYDROXY: CPT

## 2023-02-04 PROCEDURE — 83036 HEMOGLOBIN GLYCOSYLATED A1C: CPT

## 2023-02-04 PROCEDURE — 80061 LIPID PANEL: CPT

## 2023-02-04 PROCEDURE — 84403 ASSAY OF TOTAL TESTOSTERONE: CPT

## 2023-02-04 PROCEDURE — 36415 COLL VENOUS BLD VENIPUNCTURE: CPT

## 2023-02-04 PROCEDURE — 80050 GENERAL HEALTH PANEL: CPT

## 2023-02-04 PROCEDURE — 86803 HEPATITIS C AB TEST: CPT

## 2023-02-05 LAB
25(OH)D3 SERPL-MCNC: 38.3 NG/ML (ref 30–100)
ALBUMIN SERPL-MCNC: 4.3 G/DL (ref 3.5–5.2)
ALBUMIN/GLOB SERPL: 1.8 G/DL
ALP SERPL-CCNC: 79 U/L (ref 39–117)
ALT SERPL W P-5'-P-CCNC: 30 U/L (ref 1–33)
ANION GAP SERPL CALCULATED.3IONS-SCNC: 10.9 MMOL/L (ref 5–15)
AST SERPL-CCNC: 22 U/L (ref 1–32)
BASOPHILS # BLD AUTO: 0.06 10*3/MM3 (ref 0–0.2)
BASOPHILS NFR BLD AUTO: 0.8 % (ref 0–1.5)
BILIRUB SERPL-MCNC: 0.5 MG/DL (ref 0–1.2)
BUN SERPL-MCNC: 11 MG/DL (ref 6–20)
BUN/CREAT SERPL: 15.3 (ref 7–25)
CALCIUM SPEC-SCNC: 9.3 MG/DL (ref 8.6–10.5)
CHLORIDE SERPL-SCNC: 101 MMOL/L (ref 98–107)
CHOLEST SERPL-MCNC: 179 MG/DL (ref 0–200)
CO2 SERPL-SCNC: 26.1 MMOL/L (ref 22–29)
CREAT SERPL-MCNC: 0.72 MG/DL (ref 0.57–1)
DEPRECATED RDW RBC AUTO: 41.7 FL (ref 37–54)
EGFRCR SERPLBLD CKD-EPI 2021: 98.9 ML/MIN/1.73
EOSINOPHIL # BLD AUTO: 0.26 10*3/MM3 (ref 0–0.4)
EOSINOPHIL NFR BLD AUTO: 3.3 % (ref 0.3–6.2)
ERYTHROCYTE [DISTWIDTH] IN BLOOD BY AUTOMATED COUNT: 14.2 % (ref 12.3–15.4)
GLOBULIN UR ELPH-MCNC: 2.4 GM/DL
GLUCOSE SERPL-MCNC: 93 MG/DL (ref 65–99)
HBA1C MFR BLD: 5.8 % (ref 4.8–5.6)
HCT VFR BLD AUTO: 38.1 % (ref 34–46.6)
HCV AB SER DONR QL: NORMAL
HDLC SERPL-MCNC: 58 MG/DL (ref 40–60)
HGB BLD-MCNC: 12.6 G/DL (ref 12–15.9)
IMM GRANULOCYTES # BLD AUTO: 0.05 10*3/MM3 (ref 0–0.05)
IMM GRANULOCYTES NFR BLD AUTO: 0.6 % (ref 0–0.5)
LDLC SERPL CALC-MCNC: 101 MG/DL (ref 0–100)
LDLC/HDLC SERPL: 1.69 {RATIO}
LYMPHOCYTES # BLD AUTO: 1.7 10*3/MM3 (ref 0.7–3.1)
LYMPHOCYTES NFR BLD AUTO: 21.5 % (ref 19.6–45.3)
MCH RBC QN AUTO: 26.4 PG (ref 26.6–33)
MCHC RBC AUTO-ENTMCNC: 33.1 G/DL (ref 31.5–35.7)
MCV RBC AUTO: 79.9 FL (ref 79–97)
MONOCYTES # BLD AUTO: 0.68 10*3/MM3 (ref 0.1–0.9)
MONOCYTES NFR BLD AUTO: 8.6 % (ref 5–12)
NEUTROPHILS NFR BLD AUTO: 5.14 10*3/MM3 (ref 1.7–7)
NEUTROPHILS NFR BLD AUTO: 65.2 % (ref 42.7–76)
NRBC BLD AUTO-RTO: 0 /100 WBC (ref 0–0.2)
PLATELET # BLD AUTO: 289 10*3/MM3 (ref 140–450)
PMV BLD AUTO: 10.8 FL (ref 6–12)
POTASSIUM SERPL-SCNC: 3.9 MMOL/L (ref 3.5–5.2)
PROT SERPL-MCNC: 6.7 G/DL (ref 6–8.5)
RBC # BLD AUTO: 4.77 10*6/MM3 (ref 3.77–5.28)
SODIUM SERPL-SCNC: 138 MMOL/L (ref 136–145)
T4 FREE SERPL-MCNC: 0.91 NG/DL (ref 0.93–1.7)
TESTOST SERPL-MCNC: 16.7 NG/DL (ref 2.9–40.8)
TRIGL SERPL-MCNC: 115 MG/DL (ref 0–150)
TSH SERPL DL<=0.05 MIU/L-ACNC: 0.14 UIU/ML (ref 0.27–4.2)
VLDLC SERPL-MCNC: 20 MG/DL (ref 5–40)
WBC NRBC COR # BLD: 7.89 10*3/MM3 (ref 3.4–10.8)

## 2023-02-10 LAB — ESTROGEN SERPL-MCNC: 437 PG/ML (ref 40–244)

## 2023-02-16 NOTE — PROGRESS NOTES
Chief Complaint  Follow-up (2 week follow up on pneumonia. Patient states that she says she thinks its coming back. She says she thinks the medicine helped, but when she stopped medication the symptoms started back. )  Subjective    History of Present Illness  Carine Tobin is a 57 y.o. female with hypertension, hyperlipidemia, anxiety, prediabetes, Crohn's disease, GERD and cerebrovascular disease presents to [facility] for follow-up and an acute visit. She is not having any medication side effects. Recent labs reviewed. The patient recently improved from pneumonia but started coughing again 2 days ago. She has yellow productive cough. She has a decreased appetite. Negative for sore throat, fever, chills, headache, dizziness, chest pain, myalgias, nausea, vomiting and diarrhea. She restarted mucinex last night.     Specialist include: endocrinology (Megan) and gastroenterologist (Tierney).      Past Medical History:   Diagnosis Date   • Anemia    • Anxiety and depression    • Anxiety disorder, unspecified    • Bronchitis    • Bulging lumbar disc    • Crohn's disease (HCC)    • Crohn's disease of small intestine without complication (HCC)    • Crohn's disease of small intestine without complication (HCC)    • DJD (degenerative joint disease)    • Essential (primary) hypertension    • Eustachian tube dysfunction    • Fibroma of tongue    • GERD (gastroesophageal reflux disease)    • Hepatic cyst    • Hyperlipidemia, unspecified    • Impaired fasting glucose    • Insomnia    • Internal hemorrhoids    • Multiple thyroid nodules    • Nephrolithiasis    • Other cerebrovascular disease    • Palpitations    • Protrusion of lumbar intervertebral disc     L5-S1   • Sinusitis    • Vitamin D deficiency, unspecified    • Zinc deficiency 04/2012        Past Surgical History:   Procedure Laterality Date   • COLONOSCOPY  02/01/2017   • ENDOSCOPY AND COLONOSCOPY  10/16/2007   • HYSTERECTOMY   03/2019    PARTIAL   • OTHER SURGICAL HISTORY      FNA OF THYROID   • THYROID SURGERY      FNA OF THYROID        Allergies   Allergen Reactions   • Amoxicillin Hives and Unknown - Low Severity   • Levofloxacin Nausea And Vomiting, Nausea Only and Unknown - Low Severity          Current Outpatient Medications:   •  albuterol sulfate  (90 Base) MCG/ACT inhaler, Inhale 2 puffs Every 4 (Four) Hours As Needed for Wheezing., Disp: 6.7 g, Rfl: 2  •  amLODIPine (NORVASC) 5 MG tablet, Take 1 tablet by mouth Daily., Disp: 90 tablet, Rfl: 1  •  aspirin 81 MG EC tablet, Take 81 mg by mouth Daily., Disp: , Rfl:   •  atenolol (TENORMIN) 25 MG tablet, Take 1 tablet by mouth Daily., Disp: 90 tablet, Rfl: 1  •  atorvastatin (LIPITOR) 40 MG tablet, Take 1 tablet by mouth Daily., Disp: 90 tablet, Rfl: 1  •  benzonatate (TESSALON) 200 MG capsule, Take 1 capsule by mouth 3 (Three) Times a Day As Needed for Cough., Disp: 30 capsule, Rfl: 1  •  buPROPion XL (WELLBUTRIN XL) 150 MG 24 hr tablet, Take 1 tablet by mouth Daily., Disp: 90 tablet, Rfl: 1  •  CALCIUM CARBONATE-VIT D-MIN PO, Take 1 tablet by mouth Daily., Disp: , Rfl:   •  guaifenesin-dextromethorphan (MUCINEX DM)  MG tablet sustained-release 12 hour tablet, Take 2 tablets by mouth 2 (Two) Times a Day As Needed (Cough)., Disp: 40 tablet, Rfl: 0  •  multivitamin with minerals tablet tablet, Take 1 tablet by mouth Daily., Disp: , Rfl:   •  olmesartan-hydrochlorothiazide (BENICAR HCT) 40-25 MG per tablet, Take 1 tablet by mouth Daily., Disp: 90 tablet, Rfl: 1  •  vitamin D (ERGOCALCIFEROL) 1.25 MG (57191 UT) capsule capsule, Take 1 capsule by mouth Every 7 (Seven) Days., Disp: 12 capsule, Rfl: 1  •  doxycycline (VIBRAMYCIN) 100 MG capsule, Take 1 capsule by mouth 2 (Two) Times a Day for 10 days., Disp: 20 capsule, Rfl: 0  •  pantoprazole (PROTONIX) 40 MG EC tablet, Take 1 tablet by mouth Daily., Disp: 90 tablet, Rfl: 1  •  saccharomyces boulardii (Florastor) 250 MG  "capsule, Take 1 capsule by mouth 2 (Two) Times a Day for 30 days., Disp: 60 capsule, Rfl: 0    Objective   /79 (BP Location: Left arm, Patient Position: Sitting, Cuff Size: Large Adult)   Pulse 77   Temp 97.4 °F (36.3 °C) (Temporal)   Ht 175.3 cm (69\")   Wt 112 kg (247 lb)   SpO2 98%   BMI 36.48 kg/m²    Estimated body mass index is 36.48 kg/m² as calculated from the following:    Height as of this encounter: 175.3 cm (69\").    Weight as of this encounter: 112 kg (247 lb).   Physical Exam  Constitutional:       General: She is not in acute distress.  HENT:      Head: Normocephalic and atraumatic.      Right Ear: Tympanic membrane and ear canal normal.      Left Ear: Tympanic membrane and ear canal normal.   Cardiovascular:      Rate and Rhythm: Normal rate and regular rhythm.      Heart sounds: Normal heart sounds.   Pulmonary:      Breath sounds: Examination of the right-upper field reveals decreased breath sounds. Examination of the right-middle field reveals decreased breath sounds. Examination of the right-lower field reveals decreased breath sounds. Decreased breath sounds present. No wheezing, rhonchi or rales.   Lymphadenopathy:      Cervical: No cervical adenopathy.   Neurological:      General: No focal deficit present.      Mental Status: She is alert.        Result Review :  The following data was reviewed by: JENNYFER Plascencia on 02/01/2023:  Influenza A&B    Common Labsle 1/4/23   Rapid Influenza A Ag Negative   Rapid Influenza B Ag Negative             Hepatitis C Antibody (02/04/2023 11:26)  Hemoglobin A1c (02/04/2023 11:26)  Vitamin D 25 Hydroxy (02/04/2023 11:26)  Lipid Panel (02/04/2023 11:26)  Comprehensive Metabolic Panel (02/04/2023 11:26)  CBC & Differential (02/04/2023 11:26)  Testosterone (02/04/2023 11:26)  Estrogens, Total (02/04/2023 11:26)    Data reviewed: Radiologic studies XR Chest 2 View (01/16/2023 20:26)            Assessment and Plan   Diagnoses and all orders for " this visit:    1. Lower respiratory infection (e.g., bronchitis, pneumonia, pneumonitis, pulmonitis) (Primary)  Comments:  Cont mucinex. Start Doxycycline 100 mg BID, proboitic, albuterol IH prn & benzonateate 200 mg tid prn cough. Use sample Trelogy 200 mcg daily.   Orders:  -     albuterol sulfate  (90 Base) MCG/ACT inhaler; Inhale 2 puffs Every 4 (Four) Hours As Needed for Wheezing.  Dispense: 6.7 g; Refill: 2  -     POCT SARS-CoV-2 Antigen BHARAT + Flu    2. Essential hypertension  Comments:  BP well controlled on amlodipine 5 mg daily and to continue.    3. Anxiety disorder, unspecified type  Comments:  Stable and to continue wellbutrin  mg daily.    4. Hyperlipidemia, unspecified hyperlipidemia type  Comments:  Lipid panal stable on atorvastatin 40 mg daily and to continue.     5. Impaired fasting glucose  Comments:  HA1C is 5.8. Monitor.    6. Vitamin D deficiency  Comments:  Level is normal. Continue supplement.     Other orders  -     doxycycline (VIBRAMYCIN) 100 MG capsule; Take 1 capsule by mouth 2 (Two) Times a Day for 10 days.  Dispense: 20 capsule; Refill: 0  -     saccharomyces boulardii (Florastor) 250 MG capsule; Take 1 capsule by mouth 2 (Two) Times a Day for 30 days.  Dispense: 60 capsule; Refill: 0  -     benzonatate (TESSALON) 200 MG capsule; Take 1 capsule by mouth 3 (Three) Times a Day As Needed for Cough.  Dispense: 30 capsule; Refill: 1         Patient was given instructions and counseling regarding her condition or for health maintenance advice. Please see specific information pulled into the AVS if appropriate.     Follow Up   Return in about 6 months (around 8/17/2023), or if symptoms worsen or fail to improve, for Annual physical.    JENNYFER Plascencia

## 2023-02-17 ENCOUNTER — OFFICE VISIT (OUTPATIENT)
Dept: INTERNAL MEDICINE | Facility: CLINIC | Age: 58
End: 2023-02-17
Payer: COMMERCIAL

## 2023-02-17 VITALS
HEIGHT: 69 IN | BODY MASS INDEX: 36.58 KG/M2 | SYSTOLIC BLOOD PRESSURE: 122 MMHG | TEMPERATURE: 97.4 F | HEART RATE: 77 BPM | DIASTOLIC BLOOD PRESSURE: 79 MMHG | OXYGEN SATURATION: 98 % | WEIGHT: 247 LBS

## 2023-02-17 DIAGNOSIS — F41.9 ANXIETY DISORDER, UNSPECIFIED TYPE: Chronic | ICD-10-CM

## 2023-02-17 DIAGNOSIS — K21.9 GASTROESOPHAGEAL REFLUX DISEASE, UNSPECIFIED WHETHER ESOPHAGITIS PRESENT: Chronic | ICD-10-CM

## 2023-02-17 DIAGNOSIS — I10 ESSENTIAL HYPERTENSION: Chronic | ICD-10-CM

## 2023-02-17 DIAGNOSIS — R73.01 IMPAIRED FASTING GLUCOSE: ICD-10-CM

## 2023-02-17 DIAGNOSIS — Z00.00 ANNUAL PHYSICAL EXAM: ICD-10-CM

## 2023-02-17 DIAGNOSIS — E55.9 VITAMIN D DEFICIENCY: ICD-10-CM

## 2023-02-17 DIAGNOSIS — E78.5 HYPERLIPIDEMIA, UNSPECIFIED HYPERLIPIDEMIA TYPE: Chronic | ICD-10-CM

## 2023-02-17 DIAGNOSIS — J22 LOWER RESPIRATORY INFECTION (E.G., BRONCHITIS, PNEUMONIA, PNEUMONITIS, PULMONITIS): Primary | ICD-10-CM

## 2023-02-17 LAB
EXPIRATION DATE: NORMAL
FLUAV AG UPPER RESP QL IA.RAPID: NOT DETECTED
FLUBV AG UPPER RESP QL IA.RAPID: NOT DETECTED
INTERNAL CONTROL: NORMAL
Lab: NORMAL
SARS-COV-2 AG UPPER RESP QL IA.RAPID: NOT DETECTED

## 2023-02-17 PROCEDURE — 99214 OFFICE O/P EST MOD 30 MIN: CPT | Performed by: NURSE PRACTITIONER

## 2023-02-17 PROCEDURE — 87428 SARSCOV & INF VIR A&B AG IA: CPT | Performed by: NURSE PRACTITIONER

## 2023-02-17 RX ORDER — DOXYCYCLINE HYCLATE 100 MG/1
100 CAPSULE ORAL 2 TIMES DAILY
Qty: 20 CAPSULE | Refills: 0 | Status: SHIPPED | OUTPATIENT
Start: 2023-02-17 | End: 2023-02-27

## 2023-02-17 RX ORDER — PANTOPRAZOLE SODIUM 40 MG/1
40 TABLET, DELAYED RELEASE ORAL DAILY
Qty: 90 TABLET | Refills: 1 | Status: SHIPPED | OUTPATIENT
Start: 2023-02-17

## 2023-02-17 RX ORDER — BENZONATATE 200 MG/1
200 CAPSULE ORAL 3 TIMES DAILY PRN
Qty: 30 CAPSULE | Refills: 1 | Status: SHIPPED | OUTPATIENT
Start: 2023-02-17 | End: 2023-04-03 | Stop reason: SDUPTHER

## 2023-02-17 RX ORDER — ALBUTEROL SULFATE 90 UG/1
2 AEROSOL, METERED RESPIRATORY (INHALATION) EVERY 4 HOURS PRN
Qty: 6.7 G | Refills: 2 | Status: SHIPPED | OUTPATIENT
Start: 2023-02-17

## 2023-02-17 RX ORDER — SACCHAROMYCES BOULARDII 250 MG
250 CAPSULE ORAL 2 TIMES DAILY
Qty: 60 CAPSULE | Refills: 0 | Status: SHIPPED | OUTPATIENT
Start: 2023-02-17 | End: 2023-03-19

## 2023-03-31 RX ORDER — ATENOLOL 25 MG/1
25 TABLET ORAL DAILY
Qty: 90 TABLET | Refills: 1 | Status: SHIPPED | OUTPATIENT
Start: 2023-03-31

## 2023-03-31 RX ORDER — AMLODIPINE BESYLATE 5 MG/1
5 TABLET ORAL DAILY
Qty: 90 TABLET | Refills: 1 | Status: SHIPPED | OUTPATIENT
Start: 2023-03-31

## 2023-03-31 NOTE — TELEPHONE ENCOUNTER
Caller: Carine Tobin    Relationship: Self    Best call back number: [phone]    Requested Prescriptions:   Requested Prescriptions     Pending Prescriptions Disp Refills   • atenolol (TENORMIN) 25 MG tablet 90 tablet 1     Sig: Take 1 tablet by mouth Daily.   • amLODIPine (NORVASC) 5 MG tablet 90 tablet 1     Sig: Take 1 tablet by mouth Daily.        Pharmacy where request should be sent: [facility]/PHARMACY #[id number] - [place] - [address] [facility] [phone] [facility] [phone] FX     Last office visit with prescribing clinician: 2/17/2023   Last telemedicine visit with prescribing clinician: 4/4/2023   Next office visit with prescribing clinician: 4/4/2023       Does the patient have less than a 3 day supply:  [] Yes  [x] No    Would you like a call back once the refill request has been completed: [] Yes [x] No    If the office needs to give you a call back, can they leave a voicemail: [x] Yes [] No    Gege Garcia Rep   03/31/23 11:54 EDT

## 2023-04-03 ENCOUNTER — OFFICE VISIT (OUTPATIENT)
Dept: INTERNAL MEDICINE | Facility: CLINIC | Age: 58
End: 2023-04-03
Payer: COMMERCIAL

## 2023-04-03 VITALS
WEIGHT: 250.6 LBS | HEIGHT: 69 IN | HEART RATE: 76 BPM | BODY MASS INDEX: 37.12 KG/M2 | TEMPERATURE: 97.6 F | OXYGEN SATURATION: 98 % | DIASTOLIC BLOOD PRESSURE: 84 MMHG | SYSTOLIC BLOOD PRESSURE: 128 MMHG

## 2023-04-03 DIAGNOSIS — R05.3 CHRONIC COUGH: Primary | Chronic | ICD-10-CM

## 2023-04-03 DIAGNOSIS — R06.02 SOB (SHORTNESS OF BREATH) ON EXERTION: ICD-10-CM

## 2023-04-03 DIAGNOSIS — R00.2 PALPITATIONS: ICD-10-CM

## 2023-04-03 PROCEDURE — 99214 OFFICE O/P EST MOD 30 MIN: CPT | Performed by: NURSE PRACTITIONER

## 2023-04-03 RX ORDER — BENZONATATE 200 MG/1
200 CAPSULE ORAL 3 TIMES DAILY PRN
Qty: 30 CAPSULE | Refills: 1 | Status: SHIPPED | OUTPATIENT
Start: 2023-04-03

## 2023-04-03 RX ORDER — FLUTICASONE FUROATE, UMECLIDINIUM BROMIDE AND VILANTEROL TRIFENATATE 100; 62.5; 25 UG/1; UG/1; UG/1
1 POWDER RESPIRATORY (INHALATION)
Qty: 28 EACH | Refills: 0 | Status: CANCELLED | OUTPATIENT
Start: 2023-04-03

## 2023-04-03 RX ORDER — L.ACIDOPH/B.ANIMALIS/B.LONGUM 15B CELL
1 CAPSULE ORAL EVERY 12 HOURS SCHEDULED
COMMUNITY
Start: 2023-02-17

## 2023-04-03 NOTE — PROGRESS NOTES
Chief Complaint  Cough (Patient is complaining of recurring cough, since November has been taking muscinex, and was also given tessalon perels, albuterol inhaler, finished treley. Patient states her ankles have been swelling, and also showing more varicose veins.)    Subjective    History of Present Illness:  Carine Tobin with underlying hypertension, hyperlipidemia, anxiety, prediabetes, Crohn's disease, GERD and cerebrovascular disease presents to [facility] for an acute visit for complaint of recurrent cough for 5 months. At times the cough is productive-clear phlegm. She was treated for lower respiratory infection with doxycycline, albuterol inhaler, benzonatate 200 mg 3 times daily as needed cough, Mucinex and Trelegy 200 mcg daily on 2/17/2023. Reports SOBOE with walk or climbs stairs. Reports chest pain while using inhaler. Reports coughing hard and had to start wearing depends and is incontinence. She has to change her depends up to 6 times a day. Cough is worst while lying down at night. Shortness of air wakes the patient up at night.  Reports taking Protonix daily. Reports legs swelling more. Has seen cardiologist for BP issues in the past, Dr. Garcia Timmons. Patient reports she had a stroke in 2014 due to high blood.    Specialist include: endocrinology (Megan) and gastroenterologist (Tierney).     Past Medical History:   Diagnosis Date   • Anemia    • Anxiety and depression    • Anxiety disorder, unspecified    • Bronchitis    • Bulging lumbar disc    • Crohn's disease    • Crohn's disease of small intestine without complication    • Crohn's disease of small intestine without complication    • DJD (degenerative joint disease)    • Essential (primary) hypertension    • Eustachian tube dysfunction    • Fibroma of tongue    • GERD (gastroesophageal reflux disease)    • Hepatic cyst    • Hyperlipidemia, unspecified    • Impaired fasting glucose    • Insomnia    •  Internal hemorrhoids    • Multiple thyroid nodules    • Nephrolithiasis    • Other cerebrovascular disease    • Palpitations    • Protrusion of lumbar intervertebral disc     L5-S1   • Sinusitis    • Vitamin D deficiency, unspecified    • Zinc deficiency 04/2012        Past Surgical History:   Procedure Laterality Date   • COLONOSCOPY  02/01/2017   • ENDOSCOPY AND COLONOSCOPY  10/16/2007   • HYSTERECTOMY  03/2019    PARTIAL   • OTHER SURGICAL HISTORY      FNA OF THYROID   • THYROID SURGERY      FNA OF THYROID        Allergies   Allergen Reactions   • Amoxicillin Hives and Unknown - Low Severity   • Levofloxacin Nausea And Vomiting, Nausea Only and Unknown - Low Severity          Current Outpatient Medications:   •  albuterol sulfate  (90 Base) MCG/ACT inhaler, Inhale 2 puffs Every 4 (Four) Hours As Needed for Wheezing., Disp: 6.7 g, Rfl: 2  •  amLODIPine (NORVASC) 5 MG tablet, Take 1 tablet by mouth Daily., Disp: 90 tablet, Rfl: 1  •  aspirin 81 MG EC tablet, Take 1 tablet by mouth Daily., Disp: , Rfl:   •  atenolol (TENORMIN) 25 MG tablet, Take 1 tablet by mouth Daily., Disp: 90 tablet, Rfl: 1  •  atorvastatin (LIPITOR) 40 MG tablet, Take 1 tablet by mouth Daily., Disp: 90 tablet, Rfl: 1  •  benzonatate (TESSALON) 200 MG capsule, Take 1 capsule by mouth 3 (Three) Times a Day As Needed for Cough., Disp: 30 capsule, Rfl: 1  •  buPROPion XL (WELLBUTRIN XL) 150 MG 24 hr tablet, Take 1 tablet by mouth Daily., Disp: 90 tablet, Rfl: 1  •  CALCIUM CARBONATE-VIT D-MIN PO, Take 1 tablet by mouth Daily., Disp: , Rfl:   •  guaifenesin-dextromethorphan (MUCINEX DM)  MG tablet sustained-release 12 hour tablet, Take 2 tablets by mouth 2 (Two) Times a Day As Needed (Cough)., Disp: 40 tablet, Rfl: 0  •  multivitamin with minerals tablet tablet, Take 1 tablet by mouth Daily., Disp: , Rfl:   •  olmesartan-hydrochlorothiazide (BENICAR HCT) 40-25 MG per tablet, Take 1 tablet by mouth Daily., Disp: 90 tablet, Rfl: 1  •   "pantoprazole (PROTONIX) 40 MG EC tablet, Take 1 tablet by mouth Daily., Disp: 90 tablet, Rfl: 1  •  Probiotic Product (Florajen Digestion) capsule, Take 1 capsule by mouth Every 12 (Twelve) Hours., Disp: , Rfl:   •  vitamin D (ERGOCALCIFEROL) 1.25 MG (91774 UT) capsule capsule, Take 1 capsule by mouth Every 7 (Seven) Days., Disp: 12 capsule, Rfl: 1    Objective   Vital Signs:   /84 (BP Location: Left arm, Patient Position: Sitting, Cuff Size: Large Adult)   Pulse 76   Temp 97.6 °F (36.4 °C) (Temporal)   Ht 175.3 cm (69\")   Wt 114 kg (250 lb 9.6 oz)   SpO2 98%   BMI 37.01 kg/m²       Physical Exam  Vitals reviewed.   Constitutional:       General: She is not in acute distress.  HENT:      Head: Normocephalic and atraumatic.   Cardiovascular:      Rate and Rhythm: Normal rate and regular rhythm.      Heart sounds: Normal heart sounds.   Pulmonary:      Effort: Pulmonary effort is normal.      Breath sounds: Examination of the right-lower field reveals wheezing and rhonchi. Examination of the left-lower field reveals rhonchi. Wheezing and rhonchi present.   Musculoskeletal:      Right lower le+ Edema present.      Left lower le+ Edema present.   Neurological:      General: No focal deficit present.      Mental Status: She is alert.   Psychiatric:         Thought Content: Thought content normal.             Assessment and Plan:  Diagnoses and all orders for this visit:    1. Chronic cough (Primary)  Comments:  Sample of trelegy 200 mcg to use once a day given. Continue tessalon 200 mg tid prn and mucinex. CT chest ordered and referral.   Orders:  -     Ambulatory Referral to Pulmonology  -     CT Chest With & Without Contrast; Future    2. SOB (shortness of breath) on exertion  Comments:  Referral to pulmonology. CT of chest ordered.  Orders:  -     Ambulatory Referral to Cardiology  -     Ambulatory Referral to Pulmonology  -     CT Chest With & Without Contrast; Future    3. " Palpitations  Comments:  Referral to cardiology  Orders:  -     Ambulatory Referral to Cardiology    Other orders  -     benzonatate (TESSALON) 200 MG capsule; Take 1 capsule by mouth 3 (Three) Times a Day As Needed for Cough.  Dispense: 30 capsule; Refill: 1            Patient was given instructions and counseling regarding condition. Educated to go to ER if having chest pain.    Follow Up  Return if symptoms worsen or fail to improve, for Next scheduled follow up.

## 2023-04-04 ENCOUNTER — TELEPHONE (OUTPATIENT)
Dept: INTERNAL MEDICINE | Facility: CLINIC | Age: 58
End: 2023-04-04

## 2023-04-04 RX ORDER — AZITHROMYCIN 250 MG/1
TABLET, FILM COATED ORAL
Qty: 6 TABLET | Refills: 0 | Status: SHIPPED | OUTPATIENT
Start: 2023-04-04

## 2023-04-04 NOTE — TELEPHONE ENCOUNTER
Caller: Carine Tobin    Relationship: Self    Best call back number: [phone]    What medication are you requesting: ANTIBIOTIC    What are your current symptoms: RUNNY NOSE WITH YELLOW MUCUS, COUGH     How long have you been experiencing symptoms: 4.3.23     Have you had these symptoms before:    [x] Yes  [] No    Have you been treated for these symptoms before:   [x] Yes  [] No    If a prescription is needed, what is your preferred pharmacy and phone number:  [facility]/pharmacy #[id number] - [place] - [address] [facility] [phone] [facility] [phone] FX     Additional notes: PATIENT STATES SHE HAS A CHILD WITH A HEART PROBLEMS AND IS WANTING TO TRY AND KEEP HER FROM GETTING SICK.

## 2023-04-04 NOTE — TELEPHONE ENCOUNTER
Caller: Carine Tobin    Relationship to patient: Self    Best call back number: [phone]    Patient is needing: PATIENT WAS ALSO WANTING TO CHECK ON THE STATUS OF HER CT SCAN AND A REFERRAL MAKI SENT OUT YESTERDAY. SHE HAD HEARD BACK FROM CARDIOLOGY BUT NOT THE OTHER REFERRAL THAT WAS PUT IN. PLEASE ADVISE.

## 2023-04-11 ENCOUNTER — TRANSCRIBE ORDERS (OUTPATIENT)
Dept: ADMINISTRATIVE | Facility: HOSPITAL | Age: 58
End: 2023-04-11
Payer: COMMERCIAL

## 2023-04-11 DIAGNOSIS — R06.02 SHORTNESS OF BREATH ON EXERTION: Primary | ICD-10-CM

## 2023-05-02 ENCOUNTER — HOSPITAL ENCOUNTER (OUTPATIENT)
Dept: CT IMAGING | Facility: HOSPITAL | Age: 58
Discharge: HOME | End: 2023-05-02
Admitting: NURSE PRACTITIONER
Payer: COMMERCIAL

## 2023-05-02 DIAGNOSIS — R05.3 CHRONIC COUGH: Chronic | ICD-10-CM

## 2023-05-02 DIAGNOSIS — R06.02 SOB (SHORTNESS OF BREATH) ON EXERTION: ICD-10-CM

## 2023-05-02 LAB
CREAT BLDA-MCNC: 1 MG/DL
EGFRCR SERPLBLD CKD-EPI 2021: 66.7 ML/MIN/1.73

## 2023-05-02 PROCEDURE — 25510000001 IOPAMIDOL PER 1 ML: Performed by: NURSE PRACTITIONER

## 2023-05-02 PROCEDURE — 71260 CT THORAX DX C+: CPT

## 2023-05-02 PROCEDURE — 82565 ASSAY OF CREATININE: CPT

## 2023-05-02 RX ADMIN — IOPAMIDOL 100 ML: 755 INJECTION, SOLUTION INTRAVENOUS at 18:55

## 2023-05-03 DIAGNOSIS — E04.9 ENLARGED THYROID: Primary | ICD-10-CM

## 2023-05-15 ENCOUNTER — OFFICE VISIT (OUTPATIENT)
Dept: PULMONOLOGY | Facility: CLINIC | Age: 58
End: 2023-05-15
Payer: COMMERCIAL

## 2023-05-15 VITALS
TEMPERATURE: 98.4 F | HEART RATE: 75 BPM | DIASTOLIC BLOOD PRESSURE: 81 MMHG | SYSTOLIC BLOOD PRESSURE: 128 MMHG | BODY MASS INDEX: 37 KG/M2 | OXYGEN SATURATION: 95 % | HEIGHT: 69 IN | WEIGHT: 249.8 LBS

## 2023-05-15 DIAGNOSIS — E66.01 MORBID (SEVERE) OBESITY DUE TO EXCESS CALORIES: ICD-10-CM

## 2023-05-15 DIAGNOSIS — R06.83 SNORING: ICD-10-CM

## 2023-05-15 DIAGNOSIS — R05.3 CHRONIC COUGH: Primary | ICD-10-CM

## 2023-05-15 DIAGNOSIS — R91.1 LUNG NODULE: ICD-10-CM

## 2023-05-15 RX ORDER — PREDNISONE 20 MG/1
40 TABLET ORAL DAILY
Qty: 14 TABLET | Refills: 0 | Status: SHIPPED | OUTPATIENT
Start: 2023-05-15

## 2023-05-15 RX ORDER — FEXOFENADINE HCL 180 MG/1
TABLET ORAL
COMMUNITY
Start: 2023-04-13

## 2023-05-15 NOTE — PROGRESS NOTES
Pulmonary Consultation    Xenia Carpenter APRN,    Thank you for asking me to see Carine Tobin for   Chief Complaint   Patient presents with   • Wheezing   • Cough     chronic   • Shortness of Breath   • Results     Chest CT   .      History of Present Illness  Carine Tobin is a 57 y.o. female with a PMH significant for lung nodule presents for evaluation of chronic cough for the past 3 months cough is mostly dry and is accompanied with some wheezing and shortness of breath especially on exertion  Patient denies any chest pain fever or hemoptysis patient has had a right thyroid nodule that is being worked up  Patient was earlier treated for pneumonia in the January this year  Patient is presently taking albuterol inhaler and feels some improvement from that       Tobacco use history:  Never smoker      Review of Systems: History obtained from chart review and the patient.  Review of Systems   Respiratory: Positive for cough and shortness of breath.    All other systems reviewed and are negative.    As described in the HPI. Otherwise, remainder of ROS (14 systems) were negative.    Patient Active Problem List   Diagnosis   • Anemia   • Anxiety disorder   • Atypical chest pain   • Crohn's disease   • Drug-induced obesity   • Essential hypertension   • Gastroesophageal reflux disease   • Hyperlipidemia   • Impaired fasting glucose   • Ischemic stroke   • Mineral deficiency, not elsewhere classified   • Multiple joint pain   • Nontoxic single thyroid nodule   • Palpitations   • Regional ileocolitis   • Vitamin D deficiency   • Varicose veins of both lower extremities   • Crohn's disease of small intestine   • Cerebrovascular disease         Current Outpatient Medications:   •  albuterol sulfate  (90 Base) MCG/ACT inhaler, Inhale 2 puffs Every 4 (Four) Hours As Needed for Wheezing., Disp: 6.7 g, Rfl: 2  •  amLODIPine (NORVASC) 5 MG tablet, Take 1 tablet by mouth Daily., Disp: 90 tablet, Rfl: 1  •  aspirin  81 MG EC tablet, Take 1 tablet by mouth Daily., Disp: , Rfl:   •  atenolol (TENORMIN) 25 MG tablet, Take 1 tablet by mouth Daily., Disp: 90 tablet, Rfl: 1  •  atorvastatin (LIPITOR) 40 MG tablet, Take 1 tablet by mouth Daily., Disp: 90 tablet, Rfl: 1  •  benzonatate (TESSALON) 200 MG capsule, Take 1 capsule by mouth 3 (Three) Times a Day As Needed for Cough., Disp: 30 capsule, Rfl: 1  •  buPROPion XL (WELLBUTRIN XL) 150 MG 24 hr tablet, Take 1 tablet by mouth Daily., Disp: 90 tablet, Rfl: 1  •  CALCIUM CARBONATE-VIT D-MIN PO, Take 1 tablet by mouth Daily., Disp: , Rfl:   •  fexofenadine (ALLEGRA) 180 MG tablet, , Disp: , Rfl:   •  multivitamin with minerals tablet tablet, Take 1 tablet by mouth Daily., Disp: , Rfl:   •  olmesartan-hydrochlorothiazide (BENICAR HCT) 40-25 MG per tablet, Take 1 tablet by mouth Daily., Disp: 90 tablet, Rfl: 1  •  pantoprazole (PROTONIX) 40 MG EC tablet, Take 1 tablet by mouth Daily., Disp: 90 tablet, Rfl: 1  •  Probiotic Product (Florajen Digestion) capsule, Take 1 capsule by mouth Every 12 (Twelve) Hours., Disp: , Rfl:   •  vitamin D (ERGOCALCIFEROL) 1.25 MG (20174 UT) capsule capsule, Take 1 capsule by mouth Every 7 (Seven) Days., Disp: 12 capsule, Rfl: 1  •  azithromycin (Zithromax Z-Justino) 250 MG tablet, Take 2 tablets by mouth on day 1, then 1 tablet daily on days 2-5, Disp: 6 tablet, Rfl: 0  •  guaifenesin-dextromethorphan (MUCINEX DM)  MG tablet sustained-release 12 hour tablet, Take 2 tablets by mouth 2 (Two) Times a Day As Needed (Cough)., Disp: 40 tablet, Rfl: 0  •  predniSONE (DELTASONE) 20 MG tablet, Take 2 tablets by mouth Daily., Disp: 14 tablet, Rfl: 0    Allergies   Allergen Reactions   • Amoxicillin Hives and Unknown - Low Severity   • Levofloxacin Nausea And Vomiting, Nausea Only and Unknown - Low Severity       Past Medical History:   Diagnosis Date   • Anemia    • Anxiety and depression    • Anxiety disorder, unspecified    • Bronchitis    • Bulging lumbar disc   "  • Crohn's disease    • Crohn's disease of small intestine without complication    • Crohn's disease of small intestine without complication    • DJD (degenerative joint disease)    • Essential (primary) hypertension    • Eustachian tube dysfunction    • Fibroma of tongue    • GERD (gastroesophageal reflux disease)    • Hepatic cyst    • Hyperlipidemia, unspecified    • Impaired fasting glucose    • Insomnia    • Internal hemorrhoids    • Multiple thyroid nodules    • Nephrolithiasis    • Other cerebrovascular disease    • Palpitations    • Protrusion of lumbar intervertebral disc     L5-S1   • Sinusitis    • Vitamin D deficiency, unspecified    • Zinc deficiency 04/2012     Past Surgical History:   Procedure Laterality Date   • COLONOSCOPY  02/01/2017   • ENDOSCOPY AND COLONOSCOPY  10/16/2007   • HYSTERECTOMY  03/2019    PARTIAL   • OTHER SURGICAL HISTORY      FNA OF THYROID   • THYROID SURGERY      FNA OF THYROID     Social History     Socioeconomic History   • Marital status:    Tobacco Use   • Smoking status: Never     Passive exposure: Never   • Smokeless tobacco: Never   Vaping Use   • Vaping Use: Never used   Substance and Sexual Activity   • Alcohol use: Yes     Comment: rarely   • Drug use: Never   • Sexual activity: Defer     History reviewed. No pertinent family history.    CT Chest With Contrast Diagnostic    Result Date: 5/3/2023     1. No pulmonary findings to account for recurrent cough  2. Possible right thyroid nodule.  Consider ultrasound for further evaluation     KENNETH RONDON MD       Electronically Signed and Approved By: KENNETH RONDON MD on 5/03/2023 at 11:54                   Objective     Blood pressure 128/81, pulse 75, temperature 98.4 °F (36.9 °C), temperature source Tympanic, height 175.3 cm (69\"), weight 113 kg (249 lb 12.8 oz), SpO2 95 %.  Physical Exam  Vitals and nursing note reviewed.   Constitutional:       Appearance: She is obese.   HENT:      Head: Normocephalic and " atraumatic.      Nose: Nose normal.      Mouth/Throat:      Mouth: Mucous membranes are moist.   Eyes:      Extraocular Movements: Extraocular movements intact.      Pupils: Pupils are equal, round, and reactive to light.   Cardiovascular:      Rate and Rhythm: Normal rate and regular rhythm.      Pulses: Normal pulses.      Heart sounds: Normal heart sounds.   Pulmonary:      Effort: Pulmonary effort is normal.      Breath sounds: Rhonchi present.   Abdominal:      General: Abdomen is flat. Bowel sounds are normal.      Palpations: Abdomen is soft.   Musculoskeletal:         General: Normal range of motion.      Cervical back: Normal range of motion and neck supple.   Skin:     General: Skin is warm.      Capillary Refill: Capillary refill takes less than 2 seconds.   Neurological:      General: No focal deficit present.      Mental Status: She is alert and oriented to person, place, and time.   Psychiatric:         Mood and Affect: Mood normal.       Immunization History   Administered Date(s) Administered   • Flu Vaccine Intradermal Quad 18-64YR 09/16/2013   • Flu Vaccine Quad PF >36MO 10/24/2020, 11/19/2021   • FluLaval/Fluzone >6mos 10/24/2020, 11/19/2021, 11/12/2022   • Influenza, Unspecified 09/15/2014, 09/15/2014, 10/22/2020, 11/12/2022   • Shingrix 06/28/2022   • Tdap 07/19/2011, 07/19/2011            Assessment & Plan     Diagnoses and all orders for this visit:    1. Chronic cough (Primary)  -     Home Sleep Study; Future  -     Full Pulmonary Function Test With Bronchodilator; Future    2. Morbid (severe) obesity due to excess calories  -     Home Sleep Study; Future  -     Full Pulmonary Function Test With Bronchodilator; Future    3. Snoring  -     Home Sleep Study; Future  -     Full Pulmonary Function Test With Bronchodilator; Future    4. Lung nodule    Other orders  -     predniSONE (DELTASONE) 20 MG tablet; Take 2 tablets by mouth Daily.  Dispense: 14 tablet; Refill: 0         Discussion/  Recommendations:   Patient is advised to reduce weight her BMI is 36.89  Advise regular exercise  Advised to continue her albuterol inhaler  CT scan reviewed shows right middle lobe nodule which is stable from before  We will order prednisone for 1 week  We will order sleep study on account of excessive snoring along with PFTs for evaluation of her chronic cough to rule out bronchial asthma  Discussed vaccination and recommended    Class 2 Severe Obesity (BMI >=35 and <=39.9). Obesity-related health conditions include the following: hypertension. Obesity is unchanged. BMI is is above average; BMI management plan is completed. We discussed low calorie, low carb based diet program, portion control and increasing exercise.           Return in about 2 months (around 7/15/2023).      Thank you for allowing me to participate in the care of Carine Tobin. Please do not hesitate to contact me with any questions.         This document has been electronically signed by Bart Lovett MD on May 15, 2023 08:58 EDT

## 2023-05-22 ENCOUNTER — PATIENT ROUNDING (BHMG ONLY) (OUTPATIENT)
Dept: PULMONOLOGY | Facility: CLINIC | Age: 58
End: 2023-05-22
Payer: COMMERCIAL

## 2023-05-22 ENCOUNTER — HOSPITAL ENCOUNTER (OUTPATIENT)
Dept: SLEEP MEDICINE | Facility: HOSPITAL | Age: 58
Discharge: HOME | End: 2023-05-22
Admitting: INTERNAL MEDICINE
Payer: COMMERCIAL

## 2023-05-22 DIAGNOSIS — E66.01 MORBID (SEVERE) OBESITY DUE TO EXCESS CALORIES: ICD-10-CM

## 2023-05-22 DIAGNOSIS — R05.3 CHRONIC COUGH: ICD-10-CM

## 2023-05-22 DIAGNOSIS — R06.83 SNORING: ICD-10-CM

## 2023-05-22 PROCEDURE — 95806 SLEEP STUDY UNATT&RESP EFFT: CPT

## 2023-05-22 NOTE — PROGRESS NOTES
May 22, 2023    Helsulema, may I speak with Carine Tobin?    My name is Kathy     I am  with [facility] P[facility]IC[facility]  [address]  [address]  [place] [address] [phone].    Before we get started may I verify your date of birth? 1965    I am calling to officially welcome you to our practice and ask about your recent visit. Is this a good time to talk? My chart message sent for patient rounding.

## 2023-05-24 ENCOUNTER — OFFICE VISIT (OUTPATIENT)
Dept: CARDIOLOGY | Facility: CLINIC | Age: 58
End: 2023-05-24
Payer: COMMERCIAL

## 2023-05-24 VITALS
HEART RATE: 81 BPM | HEIGHT: 69 IN | SYSTOLIC BLOOD PRESSURE: 126 MMHG | DIASTOLIC BLOOD PRESSURE: 77 MMHG | BODY MASS INDEX: 37.33 KG/M2 | WEIGHT: 252 LBS

## 2023-05-24 DIAGNOSIS — R00.2 PALPITATIONS: ICD-10-CM

## 2023-05-24 DIAGNOSIS — R06.09 DYSPNEA ON EXERTION: Primary | ICD-10-CM

## 2023-05-24 PROCEDURE — 99204 OFFICE O/P NEW MOD 45 MIN: CPT | Performed by: INTERNAL MEDICINE

## 2023-05-24 PROCEDURE — 93000 ELECTROCARDIOGRAM COMPLETE: CPT | Performed by: INTERNAL MEDICINE

## 2023-05-24 NOTE — PROGRESS NOTES
Chief Complaint  Shortness of Breath and Palpitations    Alan Tobin presents to [facility] CARDIOLOGY  History of Present Illness    59-year-old female she has no significant cardiac problems in the past.  She did have a stroke in 2014 which was described as mild and thought to be due to hypertension.  She has been dealing with recurrent cough/pneumonia/bronchitis since November 2022.  She has been treated with multiple agents including antibiotics, steroids, inhalers and has followed up with pulmonology.  Within this she has had palpitations intermittently.  It seems to be a bit better than before and she was taken a lot more albuterol at that time.  Some shortness of breath occurs with palpitations.  No lengthy runs but brief hard heartbeats or thumps noticed more when lying down.  No significant chest pain.  She is a non-smoker.    PMH  Past Medical History:   Diagnosis Date   • Anemia    • Anxiety and depression    • Anxiety disorder, unspecified    • Bronchitis    • Bulging lumbar disc    • Crohn's disease    • Crohn's disease of small intestine without complication    • Crohn's disease of small intestine without complication    • DJD (degenerative joint disease)    • Essential (primary) hypertension    • Eustachian tube dysfunction    • Fibroma of tongue    • GERD (gastroesophageal reflux disease)    • Hepatic cyst    • Hyperlipidemia, unspecified    • Impaired fasting glucose    • Insomnia    • Internal hemorrhoids    • Multiple thyroid nodules    • Nephrolithiasis    • Other cerebrovascular disease    • Palpitations    • Protrusion of lumbar intervertebral disc     L5-S1   • Sinusitis    • Stroke    • Vitamin D deficiency, unspecified    • Zinc deficiency 04/2012         SURGICALHX  Past Surgical History:   Procedure Laterality Date   • COLONOSCOPY  02/01/2017   • ENDOSCOPY AND COLONOSCOPY  10/16/2007   • HYSTERECTOMY  03/2019    PARTIAL   • OTHER SURGICAL HISTORY       FNA OF THYROID   • THYROID SURGERY      FNA OF THYROID        SOC  Social History     Socioeconomic History   • Marital status:    Tobacco Use   • Smoking status: Never     Passive exposure: Never   • Smokeless tobacco: Never   Vaping Use   • Vaping Use: Never used   Substance and Sexual Activity   • Alcohol use: Yes     Comment: rarely   • Drug use: Never   • Sexual activity: Defer         FAMHX  Family History   Problem Relation Age of Onset   • No Known Problems Mother    • No Known Problems Father           ALLERGY  Allergies   Allergen Reactions   • Amoxicillin Hives and Unknown - Low Severity   • Levofloxacin Nausea And Vomiting, Nausea Only and Unknown - Low Severity        MEDSCURRENT    Current Outpatient Medications:   •  albuterol sulfate  (90 Base) MCG/ACT inhaler, Inhale 2 puffs Every 4 (Four) Hours As Needed for Wheezing., Disp: 6.7 g, Rfl: 2  •  amLODIPine (NORVASC) 5 MG tablet, Take 1 tablet by mouth Daily., Disp: 90 tablet, Rfl: 1  •  aspirin 81 MG EC tablet, Take 1 tablet by mouth Daily., Disp: , Rfl:   •  atenolol (TENORMIN) 25 MG tablet, Take 1 tablet by mouth Daily., Disp: 90 tablet, Rfl: 1  •  atorvastatin (LIPITOR) 40 MG tablet, Take 1 tablet by mouth Daily., Disp: 90 tablet, Rfl: 1  •  benzonatate (TESSALON) 200 MG capsule, Take 1 capsule by mouth 3 (Three) Times a Day As Needed for Cough., Disp: 30 capsule, Rfl: 1  •  buPROPion XL (WELLBUTRIN XL) 150 MG 24 hr tablet, Take 1 tablet by mouth Daily., Disp: 90 tablet, Rfl: 1  •  CALCIUM CARBONATE-VIT D-MIN PO, Take 1 tablet by mouth Daily., Disp: , Rfl:   •  fexofenadine (ALLEGRA) 180 MG tablet, , Disp: , Rfl:   •  multivitamin with minerals tablet tablet, Take 1 tablet by mouth Daily., Disp: , Rfl:   •  olmesartan-hydrochlorothiazide (BENICAR HCT) 40-25 MG per tablet, Take 1 tablet by mouth Daily., Disp: 90 tablet, Rfl: 1  •  pantoprazole (PROTONIX) 40 MG EC tablet, Take 1 tablet by mouth Daily., Disp: 90 tablet, Rfl: 1  •   "predniSONE (DELTASONE) 20 MG tablet, Take 2 tablets by mouth Daily., Disp: 14 tablet, Rfl: 0  •  Probiotic Product (Florajen Digestion) capsule, Take 1 capsule by mouth Every 12 (Twelve) Hours., Disp: , Rfl:   •  vitamin D (ERGOCALCIFEROL) 1.25 MG (88917 UT) capsule capsule, Take 1 capsule by mouth Every 7 (Seven) Days., Disp: 12 capsule, Rfl: 1  •  azithromycin (Zithromax Z-Justino) 250 MG tablet, Take 2 tablets by mouth on day 1, then 1 tablet daily on days 2-5, Disp: 6 tablet, Rfl: 0  •  guaifenesin-dextromethorphan (MUCINEX DM)  MG tablet sustained-release 12 hour tablet, Take 2 tablets by mouth 2 (Two) Times a Day As Needed (Cough)., Disp: 40 tablet, Rfl: 0      Review of Systems   Constitutional: Negative.   HENT: Negative.    Eyes: Negative.    Cardiovascular: Positive for dyspnea on exertion, irregular heartbeat, leg swelling and palpitations. Negative for chest pain.   Respiratory: Positive for cough and shortness of breath.    Endocrine: Negative.    Hematologic/Lymphatic: Negative.    Skin: Negative.    Musculoskeletal: Negative.    Gastrointestinal: Negative.    Genitourinary: Negative.    Neurological: Negative.    Psychiatric/Behavioral: Negative.         Objective     /77   Pulse 81   Ht 175.3 cm (69\")   Wt 114 kg (252 lb)   BMI 37.21 kg/m²       General Appearance:   · well developed  · well nourished  HENT:   · oropharynx moist  · lips not cyanotic  Neck:  · thyroid not enlarged  · supple  Respiratory:  · no respiratory distress  · normal breath sounds  · no rales  Cardiovascular:  · no jugular venous distention  · regular rhythm  · apical impulse normal  · S1 normal, S2 normal  · no S3, no S4   · no murmur  · no rub, no thrill  · carotid pulses normal; no bruit  · pedal pulses normal  · lower extremity edema: Trace edema with venous varicosities  Musculoskeletal:  · no clubbing of fingers.   · normocephalic, head atraumatic  Skin:   · warm, dry  Psychiatric:  · judgement and insight " appropriate  · normal mood and affect      Result Review :     The following data was reviewed by: Nasim Lu MD on 05/24/2023:    CMP        2/4/2023    11:26 5/2/2023    18:39   CMP   Glucose 93      BUN 11      Creatinine 0.72   1.00     EGFR 98.9   66.7     Sodium 138      Potassium 3.9      Chloride 101      Calcium 9.3      Total Protein 6.7      Albumin 4.3      Globulin 2.4      Total Bilirubin 0.5      Alkaline Phosphatase 79      AST (SGOT) 22      ALT (SGPT) 30      Albumin/Globulin Ratio 1.8      BUN/Creatinine Ratio 15.3      Anion Gap 10.9        CBC        2/4/2023    11:26   CBC   WBC 7.89     RBC 4.77     Hemoglobin 12.6     Hematocrit 38.1     MCV 79.9     MCH 26.4     MCHC 33.1     RDW 14.2     Platelets 289       Lipid Panel        2/4/2023    11:26   Lipid Panel   Total Cholesterol 179     Triglycerides 115     HDL Cholesterol 58     VLDL Cholesterol 20     LDL Cholesterol  101     LDL/HDL Ratio 1.69       TSH        2/4/2023    11:26   TSH   TSH 0.143         Data reviewed: Primary care records reviewed, CT scan reviewed, pulmonary records reviewed       ECG 12 Lead    Date/Time: 5/24/2023 1:42 PM  Performed by: DAVID Lu MD  Authorized by: DAVID Lu MD   Comparison: not compared with previous ECG   Previous ECG: no previous ECG available  Rhythm: sinus rhythm  Ectopy: unifocal PVCs  Conduction: conduction normal  ST Segments: ST segments normal  T Waves: T waves normal  QRS axis: normal  Other: no other findings    Clinical impression: non-specific ECG                        Assessment and Plan        ASSESSMENT:  Encounter Diagnoses   Name Primary?   • Dyspnea on exertion Yes   • Palpitations          PLAN:    1.  Palpitations-as described this is likely related to ectopy.  There are 2 PVCs on her baseline EKG tracing today.  A Holter monitor will be scheduled to evaluate further.  2.  She has dyspnea, in conjunction with chronic bronchitis which has been  evaluated and treated through pulmonary.  I do not think her dyspnea is likely cardiac, we will schedule an echocardiogram to evaluate for underlying structural abnormalities but more than likely this is due to her underlying pulmonary process.  3.  We will discuss diagnostic results when available          Patient was given instructions and counseling regarding her condition or for health maintenance advice. Please see specific information pulled into the AVS if appropriate.             DAVID Lu MD  5/24/2023    13:40 EDT

## 2023-05-25 ENCOUNTER — HOSPITAL ENCOUNTER (OUTPATIENT)
Dept: RESPIRATORY THERAPY | Facility: HOSPITAL | Age: 58
Discharge: HOME | End: 2023-05-25
Payer: COMMERCIAL

## 2023-05-25 DIAGNOSIS — R05.3 CHRONIC COUGH: ICD-10-CM

## 2023-05-25 DIAGNOSIS — E66.01 MORBID (SEVERE) OBESITY DUE TO EXCESS CALORIES: ICD-10-CM

## 2023-05-25 DIAGNOSIS — R06.83 SNORING: ICD-10-CM

## 2023-05-25 PROCEDURE — 94726 PLETHYSMOGRAPHY LUNG VOLUMES: CPT

## 2023-05-25 PROCEDURE — 94060 EVALUATION OF WHEEZING: CPT

## 2023-05-25 PROCEDURE — 94729 DIFFUSING CAPACITY: CPT

## 2023-05-25 RX ORDER — LEVALBUTEROL INHALATION SOLUTION 1.25 MG/3ML
1.25 SOLUTION RESPIRATORY (INHALATION) ONCE
Status: COMPLETED | OUTPATIENT
Start: 2023-05-25 | End: 2023-05-25

## 2023-05-25 RX ADMIN — LEVALBUTEROL HYDROCHLORIDE 1.25 MG: 1.25 SOLUTION RESPIRATORY (INHALATION) at 16:26

## 2023-09-01 NOTE — PROGRESS NOTES
Chief Complaint  Annual Exam (Physical with pap, labs done. )  Subjective    History of Present Illness  Carine Tobin is a 57 y.o. female who presents to [facility] for:    Her annual physical exam with female exam. Patient had hysterectomy.    2.   Follow-up of hypertension, hyperlipidemia, anxiety and prediabetes. Recent labs reviewed.     Specialist include: endocrinology ([facility]), gastroenterologist ([facility]),  Dr. Lu, cardiology and Dr. Lovett, pulmonology.      Current Outpatient Medications:     albuterol sulfate  (90 Base) MCG/ACT inhaler, Inhale 2 puffs Every 4 (Four) Hours As Needed for Wheezing., Disp: 6.7 g, Rfl: 2    aspirin 81 MG EC tablet, Take 1 tablet by mouth Daily., Disp: , Rfl:     benzonatate (TESSALON) 200 MG capsule, Take 1 capsule by mouth 3 (Three) Times a Day As Needed for Cough., Disp: 30 capsule, Rfl: 1    CALCIUM CARBONATE-VIT D-MIN PO, Take 1 tablet by mouth Daily., Disp: , Rfl:     fexofenadine (ALLEGRA) 180 MG tablet, , Disp: , Rfl:     multivitamin with minerals tablet tablet, Take 1 tablet by mouth Daily., Disp: , Rfl:     predniSONE (DELTASONE) 20 MG tablet, Take 2 tablets by mouth Daily., Disp: 14 tablet, Rfl: 0    Probiotic Product (Florajen Digestion) capsule, Take 1 capsule by mouth Every 12 (Twelve) Hours., Disp: , Rfl:     amLODIPine (NORVASC) 5 MG tablet, Take 1 tablet by mouth Daily., Disp: 90 tablet, Rfl: 1    atenolol (TENORMIN) 25 MG tablet, Take 1 tablet by mouth Daily., Disp: 90 tablet, Rfl: 1    atorvastatin (LIPITOR) 40 MG tablet, Take 1 tablet by mouth Daily., Disp: 90 tablet, Rfl: 1    buPROPion XL (WELLBUTRIN XL) 150 MG 24 hr tablet, Take 1 tablet by mouth Daily., Disp: 90 tablet, Rfl: 1    fluticasone (FLONASE) 50 MCG/ACT nasal spray, 2 sprays into the nostril(s) as directed by provider Daily., Disp: 1 g, Rfl: 5    olmesartan-hydrochlorothiazide (BENICAR HCT) 40-25 MG per tablet, Take 1 tablet by mouth Daily.,  "Disp: 90 tablet, Rfl: 1    pantoprazole (PROTONIX) 40 MG EC tablet, Take 1 tablet by mouth Daily., Disp: 90 tablet, Rfl: 1    vitamin D (ERGOCALCIFEROL) 1.25 MG (60861 UT) capsule capsule, Take 1 capsule by mouth Every 7 (Seven) Days., Disp: 12 capsule, Rfl: 1  Allergies   Allergen Reactions    Amoxicillin Hives and Unknown - Low Severity    Levofloxacin Nausea And Vomiting, Nausea Only and Unknown - Low Severity      Past Medical History:   Diagnosis Date    Anemia     Anxiety and depression     Anxiety disorder, unspecified     Bronchitis     Bulging lumbar disc     Chronic bronchitis 11/2022    Been coughing since this time    Crohn's disease     Crohn's disease of small intestine without complication     Crohn's disease of small intestine without complication     DJD (degenerative joint disease)     Essential (primary) hypertension     Eustachian tube dysfunction     Fibroma of tongue     GERD (gastroesophageal reflux disease)     Hepatic cyst     Hyperlipidemia, unspecified     Impaired fasting glucose     Insomnia     Internal hemorrhoids     Multiple thyroid nodules     Nephrolithiasis     Other cerebrovascular disease     Palpitations     Pneumonia 01/2023    Fluid in right lung until April despite meds    Protrusion of lumbar intervertebral disc     L5-S1    Sinusitis     Stroke     Vitamin D deficiency, unspecified     Zinc deficiency 04/2012      Past Surgical History:   Procedure Laterality Date    COLONOSCOPY  02/01/2017    ENDOSCOPY AND COLONOSCOPY  10/16/2007    HYSTERECTOMY  03/2019    PARTIAL    OTHER SURGICAL HISTORY      FNA OF THYROID    THYROID SURGERY      FNA OF THYROID        Objective   /70 (BP Location: Left arm, Patient Position: Sitting, Cuff Size: Large Adult)   Pulse 80   Temp 98.1 °F (36.7 °C) (Temporal)   Ht 172.7 cm (67.99\")   Wt 113 kg (249 lb)   SpO2 96%   BMI 37.87 kg/m²    Estimated body mass index is 37.87 kg/m² as calculated from the following:    Height as of this " "encounter: 172.7 cm (67.99\").    Weight as of this encounter: 113 kg (249 lb).     Physical Exam  Vitals reviewed. Exam conducted with a chaperone present.   Constitutional:       General: She is not in acute distress.  HENT:      Head: Normocephalic and atraumatic.      Right Ear: Tympanic membrane and ear canal normal.      Left Ear: Tympanic membrane and ear canal normal.   Eyes:      Conjunctiva/sclera: Conjunctivae normal.   Cardiovascular:      Rate and Rhythm: Normal rate and regular rhythm.      Heart sounds: Normal heart sounds. No murmur heard.  Pulmonary:      Effort: Pulmonary effort is normal.      Breath sounds: Normal breath sounds. No wheezing, rhonchi or rales.   Chest:   Breasts:     Right: Normal. No mass.      Left: Normal. No mass.   Abdominal:      General: There is no distension.      Palpations: Abdomen is soft. There is no mass.      Tenderness: There is no abdominal tenderness.   Genitourinary:     General: Normal vulva.      Exam position: Lithotomy position.      Labia:         Right: No lesion.         Left: No lesion.       Vagina: Normal.      Adnexa: Right adnexa normal and left adnexa normal.        Right: No mass.          Left: No mass.     Musculoskeletal:      Right lower leg: No edema.      Left lower leg: No edema.   Lymphadenopathy:      Cervical: No cervical adenopathy.      Upper Body:      Right upper body: No supraclavicular or axillary adenopathy.      Left upper body: No supraclavicular or axillary adenopathy.   Skin:     General: Skin is warm and dry.      Coloration: Skin is not jaundiced or pale.   Neurological:      General: No focal deficit present.      Mental Status: She is alert.   Psychiatric:         Mood and Affect: Mood normal.         Thought Content: Thought content normal.        Result Review :  The following data was reviewed by: JENNYFER Plascencia on 09/05/2023:  CMP          2/4/2023    11:26 5/2/2023    18:39 9/2/2023    11:26   CMP   Glucose 93   " 104    BUN 11   18    Creatinine 0.72  1.00  0.83    EGFR 98.9  66.7  83.4    Sodium 138   137    Potassium 3.9   3.8    Chloride 101   102    Calcium 9.3   9.1    Total Protein 6.7   7.1    Albumin 4.3   4.3    Globulin 2.4   2.8    Total Bilirubin 0.5   0.5    Alkaline Phosphatase 79   73    AST (SGOT) 22   21    ALT (SGPT) 30   25    Albumin/Globulin Ratio 1.8   1.5    BUN/Creatinine Ratio 15.3   21.7    Anion Gap 10.9   12.2      CBC w/diff          2/4/2023    11:26 9/2/2023    11:26   CBC w/Diff   WBC 7.89  5.36    RBC 4.77  5.20    Hemoglobin 12.6  13.9    Hematocrit 38.1  41.9    MCV 79.9  80.6    MCH 26.4  26.7    MCHC 33.1  33.2    RDW 14.2  13.6    Platelets 289  284    Neutrophil Rel % 65.2  50.9    Immature Granulocyte Rel % 0.6  0.2    Lymphocyte Rel % 21.5  36.8    Monocyte Rel % 8.6  11.4    Eosinophil Rel % 3.3  0.0    Basophil Rel % 0.8  0.7      Lipid Panel          2/4/2023    11:26 9/2/2023    11:26   Lipid Panel   Total Cholesterol 179  172    Triglycerides 115  89    HDL Cholesterol 58  52    VLDL Cholesterol 20  16    LDL Cholesterol  101  104    LDL/HDL Ratio 1.69  1.97      TSH          2/4/2023    11:26 9/2/2023    11:26   TSH   TSH 0.143  0.212      A1C Last 3 Results          2/4/2023    11:26 9/2/2023    11:26   HGBA1C Last 3 Results   Hemoglobin A1C 5.80  5.80    Vitamin D,25-Hydroxy (09/02/2023 11:26)               Assessment and Plan   Diagnoses and all orders for this visit:    1. Annual physical exam (Primary)    2. Essential hypertension  -     Comprehensive Metabolic Panel; Future  -     CBC & Differential; Future  -     T4, Free; Future  -     TSH; Future    3. Anxiety disorder, unspecified type    4. Hyperlipidemia, unspecified hyperlipidemia type  -     Lipid Panel; Future  -     atorvastatin (LIPITOR) 40 MG tablet; Take 1 tablet by mouth Daily.  Dispense: 90 tablet; Refill: 1    5. Impaired fasting glucose  -     Hemoglobin A1c; Future    6. Vitamin D deficiency  -      Vitamin D,25-Hydroxy; Future    7. Body mass index (BMI) of 37.0 to 37.9 in adult    8. Encounter for screening mammogram for malignant neoplasm of breast  -     Mammo Screening Digital Tomosynthesis Bilateral With CAD; Future    9. Screening for vaginal cancer  -     IgP, Aptima HPV    10. Hyperlipidemia, unspecified hyperlipidemia type  Comments:  Stable on atorvastatin 40 mg daily and to continue.  Orders:  -     Lipid Panel; Future  -     atorvastatin (LIPITOR) 40 MG tablet; Take 1 tablet by mouth Daily.  Dispense: 90 tablet; Refill: 1    11. Gastroesophageal reflux disease, unspecified whether esophagitis present  Comments:  Stable on medication continue current treatment plan.  Orders:  -     pantoprazole (PROTONIX) 40 MG EC tablet; Take 1 tablet by mouth Daily.  Dispense: 90 tablet; Refill: 1    Other orders  -     buPROPion XL (WELLBUTRIN XL) 150 MG 24 hr tablet; Take 1 tablet by mouth Daily.  Dispense: 90 tablet; Refill: 1  -     olmesartan-hydrochlorothiazide (BENICAR HCT) 40-25 MG per tablet; Take 1 tablet by mouth Daily.  Dispense: 90 tablet; Refill: 1  -     atenolol (TENORMIN) 25 MG tablet; Take 1 tablet by mouth Daily.  Dispense: 90 tablet; Refill: 1  -     amLODIPine (NORVASC) 5 MG tablet; Take 1 tablet by mouth Daily.  Dispense: 90 tablet; Refill: 1  -     vitamin D (ERGOCALCIFEROL) 1.25 MG (20175 UT) capsule capsule; Take 1 capsule by mouth Every 7 (Seven) Days.  Dispense: 12 capsule; Refill: 1  -     fluticasone (FLONASE) 50 MCG/ACT nasal spray; 2 sprays into the nostril(s) as directed by provider Daily.  Dispense: 1 g; Refill: 5      Annual exam: Discussed healthy diet, exercise, adequate sleep, cancer screening, immunizations and preventative care. Annual eye exam and twice yearly dental cleaning.    Essential hypertension:  Blood pressure well-controlled on amlodipine 5 mg daily and to continue.     Anxiety disorder: Stable and to continue wellbutrin  mg daily.     Hyperlipidemia:  Lipid  panal stable on atorvastatin 40 mg daily and to continue.      Impaired fasting glucose: HA1C is 5.80. Continue lifestyle modifications.  Monitor.     Vitamin D deficiency: Level is normal. Continue supplement.    BMI 37: Discussed lifestyle modifications including weight loss.  The patient will consider Wegovy a call next month if she is interested in taking this.  The medication and side effects were discussed.    Patient was given instructions and counseling regarding her condition or for health maintenance advice. Please see specific information pulled into the AVS if appropriate.     Follow Up   Return in about 6 months (around 3/5/2024) for Recheck.    Dictated Utilizing Dragon Dictation.  Please note that portions of this note were completed with a voice recognition program.  Part of this note may be an electronic transcription/translation of spoken language to printed text using the Dragon Dictation System.    JENNYFER Plascencia

## 2023-09-02 ENCOUNTER — LAB (OUTPATIENT)
Dept: LAB | Facility: HOSPITAL | Age: 58
End: 2023-09-02
Payer: COMMERCIAL

## 2023-09-02 DIAGNOSIS — E55.9 VITAMIN D DEFICIENCY: ICD-10-CM

## 2023-09-02 DIAGNOSIS — Z00.00 ANNUAL PHYSICAL EXAM: ICD-10-CM

## 2023-09-02 DIAGNOSIS — R73.01 IMPAIRED FASTING GLUCOSE: ICD-10-CM

## 2023-09-02 LAB
25(OH)D3 SERPL-MCNC: 40.5 NG/ML (ref 30–100)
ALBUMIN SERPL-MCNC: 4.3 G/DL (ref 3.5–5.2)
ALBUMIN/GLOB SERPL: 1.5 G/DL
ALP SERPL-CCNC: 73 U/L (ref 39–117)
ALT SERPL W P-5'-P-CCNC: 25 U/L (ref 1–33)
ANION GAP SERPL CALCULATED.3IONS-SCNC: 12.2 MMOL/L (ref 5–15)
AST SERPL-CCNC: 21 U/L (ref 1–32)
BASOPHILS # BLD AUTO: 0.04 10*3/MM3 (ref 0–0.2)
BASOPHILS NFR BLD AUTO: 0.7 % (ref 0–1.5)
BILIRUB SERPL-MCNC: 0.5 MG/DL (ref 0–1.2)
BUN SERPL-MCNC: 18 MG/DL (ref 6–20)
BUN/CREAT SERPL: 21.7 (ref 7–25)
CALCIUM SPEC-SCNC: 9.1 MG/DL (ref 8.6–10.5)
CHLORIDE SERPL-SCNC: 102 MMOL/L (ref 98–107)
CHOLEST SERPL-MCNC: 172 MG/DL (ref 0–200)
CO2 SERPL-SCNC: 22.8 MMOL/L (ref 22–29)
CREAT SERPL-MCNC: 0.83 MG/DL (ref 0.57–1)
DEPRECATED RDW RBC AUTO: 39.8 FL (ref 37–54)
EGFRCR SERPLBLD CKD-EPI 2021: 83.4 ML/MIN/1.73
EOSINOPHIL # BLD AUTO: 0 10*3/MM3 (ref 0–0.4)
EOSINOPHIL NFR BLD AUTO: 0 % (ref 0.3–6.2)
ERYTHROCYTE [DISTWIDTH] IN BLOOD BY AUTOMATED COUNT: 13.6 % (ref 12.3–15.4)
GLOBULIN UR ELPH-MCNC: 2.8 GM/DL
GLUCOSE SERPL-MCNC: 104 MG/DL (ref 65–99)
HBA1C MFR BLD: 5.8 % (ref 4.8–5.6)
HCT VFR BLD AUTO: 41.9 % (ref 34–46.6)
HDLC SERPL-MCNC: 52 MG/DL (ref 40–60)
HGB BLD-MCNC: 13.9 G/DL (ref 12–15.9)
IMM GRANULOCYTES # BLD AUTO: 0.01 10*3/MM3 (ref 0–0.05)
IMM GRANULOCYTES NFR BLD AUTO: 0.2 % (ref 0–0.5)
LDLC SERPL CALC-MCNC: 104 MG/DL (ref 0–100)
LDLC/HDLC SERPL: 1.97 {RATIO}
LYMPHOCYTES # BLD AUTO: 1.97 10*3/MM3 (ref 0.7–3.1)
LYMPHOCYTES NFR BLD AUTO: 36.8 % (ref 19.6–45.3)
MCH RBC QN AUTO: 26.7 PG (ref 26.6–33)
MCHC RBC AUTO-ENTMCNC: 33.2 G/DL (ref 31.5–35.7)
MCV RBC AUTO: 80.6 FL (ref 79–97)
MONOCYTES # BLD AUTO: 0.61 10*3/MM3 (ref 0.1–0.9)
MONOCYTES NFR BLD AUTO: 11.4 % (ref 5–12)
NEUTROPHILS NFR BLD AUTO: 2.73 10*3/MM3 (ref 1.7–7)
NEUTROPHILS NFR BLD AUTO: 50.9 % (ref 42.7–76)
NRBC BLD AUTO-RTO: 0 /100 WBC (ref 0–0.2)
PLATELET # BLD AUTO: 284 10*3/MM3 (ref 140–450)
PMV BLD AUTO: 11 FL (ref 6–12)
POTASSIUM SERPL-SCNC: 3.8 MMOL/L (ref 3.5–5.2)
PROT SERPL-MCNC: 7.1 G/DL (ref 6–8.5)
RBC # BLD AUTO: 5.2 10*6/MM3 (ref 3.77–5.28)
SODIUM SERPL-SCNC: 137 MMOL/L (ref 136–145)
T4 FREE SERPL-MCNC: 0.97 NG/DL (ref 0.93–1.7)
TRIGL SERPL-MCNC: 89 MG/DL (ref 0–150)
TSH SERPL DL<=0.05 MIU/L-ACNC: 0.21 UIU/ML (ref 0.27–4.2)
VLDLC SERPL-MCNC: 16 MG/DL (ref 5–40)
WBC NRBC COR # BLD: 5.36 10*3/MM3 (ref 3.4–10.8)

## 2023-09-02 PROCEDURE — 80061 LIPID PANEL: CPT

## 2023-09-02 PROCEDURE — 83036 HEMOGLOBIN GLYCOSYLATED A1C: CPT

## 2023-09-02 PROCEDURE — 36415 COLL VENOUS BLD VENIPUNCTURE: CPT

## 2023-09-02 PROCEDURE — 84439 ASSAY OF FREE THYROXINE: CPT

## 2023-09-02 PROCEDURE — 82306 VITAMIN D 25 HYDROXY: CPT

## 2023-09-02 PROCEDURE — 80050 GENERAL HEALTH PANEL: CPT

## 2023-09-05 ENCOUNTER — OFFICE VISIT (OUTPATIENT)
Dept: INTERNAL MEDICINE | Facility: CLINIC | Age: 58
End: 2023-09-05
Payer: COMMERCIAL

## 2023-09-05 VITALS
TEMPERATURE: 98.1 F | HEART RATE: 80 BPM | DIASTOLIC BLOOD PRESSURE: 70 MMHG | SYSTOLIC BLOOD PRESSURE: 120 MMHG | HEIGHT: 68 IN | OXYGEN SATURATION: 96 % | WEIGHT: 249 LBS | BODY MASS INDEX: 37.74 KG/M2

## 2023-09-05 DIAGNOSIS — R73.01 IMPAIRED FASTING GLUCOSE: ICD-10-CM

## 2023-09-05 DIAGNOSIS — E78.5 HYPERLIPIDEMIA, UNSPECIFIED HYPERLIPIDEMIA TYPE: ICD-10-CM

## 2023-09-05 DIAGNOSIS — Z12.31 ENCOUNTER FOR SCREENING MAMMOGRAM FOR MALIGNANT NEOPLASM OF BREAST: ICD-10-CM

## 2023-09-05 DIAGNOSIS — Z12.72 SCREENING FOR VAGINAL CANCER: ICD-10-CM

## 2023-09-05 DIAGNOSIS — F41.9 ANXIETY DISORDER, UNSPECIFIED TYPE: ICD-10-CM

## 2023-09-05 DIAGNOSIS — K21.9 GASTROESOPHAGEAL REFLUX DISEASE, UNSPECIFIED WHETHER ESOPHAGITIS PRESENT: Chronic | ICD-10-CM

## 2023-09-05 DIAGNOSIS — Z00.00 ANNUAL PHYSICAL EXAM: Primary | ICD-10-CM

## 2023-09-05 DIAGNOSIS — I10 ESSENTIAL HYPERTENSION: ICD-10-CM

## 2023-09-05 DIAGNOSIS — E55.9 VITAMIN D DEFICIENCY: ICD-10-CM

## 2023-09-05 DIAGNOSIS — E78.5 HYPERLIPIDEMIA, UNSPECIFIED HYPERLIPIDEMIA TYPE: Chronic | ICD-10-CM

## 2023-09-05 PROCEDURE — 87624 HPV HI-RISK TYP POOLED RSLT: CPT | Performed by: NURSE PRACTITIONER

## 2023-09-05 PROCEDURE — G0123 SCREEN CERV/VAG THIN LAYER: HCPCS | Performed by: NURSE PRACTITIONER

## 2023-09-05 RX ORDER — ATORVASTATIN CALCIUM 40 MG/1
40 TABLET, FILM COATED ORAL DAILY
Qty: 90 TABLET | Refills: 1 | Status: SHIPPED | OUTPATIENT
Start: 2023-09-05

## 2023-09-05 RX ORDER — AMLODIPINE BESYLATE 5 MG/1
5 TABLET ORAL DAILY
Qty: 90 TABLET | Refills: 1 | Status: SHIPPED | OUTPATIENT
Start: 2023-09-05

## 2023-09-05 RX ORDER — ERGOCALCIFEROL 1.25 MG/1
50000 CAPSULE ORAL
Qty: 12 CAPSULE | Refills: 1 | Status: SHIPPED | OUTPATIENT
Start: 2023-09-05

## 2023-09-05 RX ORDER — BUPROPION HYDROCHLORIDE 150 MG/1
150 TABLET ORAL DAILY
Qty: 90 TABLET | Refills: 1 | Status: SHIPPED | OUTPATIENT
Start: 2023-09-05

## 2023-09-05 RX ORDER — FLUTICASONE PROPIONATE 50 MCG
2 SPRAY, SUSPENSION (ML) NASAL DAILY
Qty: 1 G | Refills: 5 | Status: SHIPPED | OUTPATIENT
Start: 2023-09-05

## 2023-09-05 RX ORDER — ATENOLOL 25 MG/1
25 TABLET ORAL DAILY
Qty: 90 TABLET | Refills: 1 | Status: SHIPPED | OUTPATIENT
Start: 2023-09-05

## 2023-09-05 RX ORDER — OLMESARTAN MEDOXOMIL AND HYDROCHLOROTHIAZIDE 40/25 40; 25 MG/1; MG/1
1 TABLET ORAL DAILY
Qty: 90 TABLET | Refills: 1 | Status: SHIPPED | OUTPATIENT
Start: 2023-09-05

## 2023-09-05 RX ORDER — PANTOPRAZOLE SODIUM 40 MG/1
40 TABLET, DELAYED RELEASE ORAL DAILY
Qty: 90 TABLET | Refills: 1 | Status: SHIPPED | OUTPATIENT
Start: 2023-09-05

## 2023-09-07 LAB
CYTOLOGIST CVX/VAG CYTO: NORMAL
CYTOLOGY CVX/VAG DOC CYTO: NORMAL
CYTOLOGY CVX/VAG DOC THIN PREP: NORMAL
DX ICD CODE: NORMAL
HIV 1 & 2 AB SER-IMP: NORMAL
HPV I/H RISK 4 DNA CVX QL PROBE+SIG AMP: NEGATIVE
OTHER STN SPEC: NORMAL
STAT OF ADQ CVX/VAG CYTO-IMP: NORMAL

## 2023-12-04 ENCOUNTER — HOSPITAL ENCOUNTER (OUTPATIENT)
Dept: MAMMOGRAPHY | Facility: HOSPITAL | Age: 58
Discharge: HOME | End: 2023-12-04
Admitting: NURSE PRACTITIONER
Payer: COMMERCIAL

## 2023-12-04 DIAGNOSIS — Z12.31 ENCOUNTER FOR SCREENING MAMMOGRAM FOR MALIGNANT NEOPLASM OF BREAST: ICD-10-CM

## 2023-12-04 PROCEDURE — 77067 SCR MAMMO BI INCL CAD: CPT

## 2023-12-04 PROCEDURE — 77063 BREAST TOMOSYNTHESIS BI: CPT

## 2024-03-09 ENCOUNTER — LAB (OUTPATIENT)
Dept: LAB | Facility: HOSPITAL | Age: 59
End: 2024-03-09
Payer: COMMERCIAL

## 2024-03-09 DIAGNOSIS — E55.9 VITAMIN D DEFICIENCY: ICD-10-CM

## 2024-03-09 DIAGNOSIS — E78.5 HYPERLIPIDEMIA, UNSPECIFIED HYPERLIPIDEMIA TYPE: ICD-10-CM

## 2024-03-09 DIAGNOSIS — I10 ESSENTIAL HYPERTENSION: ICD-10-CM

## 2024-03-09 DIAGNOSIS — R73.01 IMPAIRED FASTING GLUCOSE: ICD-10-CM

## 2024-03-09 LAB
25(OH)D3 SERPL-MCNC: 30.2 NG/ML (ref 30–100)
ALBUMIN SERPL-MCNC: 4.1 G/DL (ref 3.5–5.2)
ALBUMIN/GLOB SERPL: 1.4 G/DL
ALP SERPL-CCNC: 72 U/L (ref 39–117)
ALT SERPL W P-5'-P-CCNC: 19 U/L (ref 1–33)
ANION GAP SERPL CALCULATED.3IONS-SCNC: 12.2 MMOL/L (ref 5–15)
AST SERPL-CCNC: 23 U/L (ref 1–32)
BASOPHILS # BLD AUTO: 0.04 10*3/MM3 (ref 0–0.2)
BASOPHILS NFR BLD AUTO: 0.5 % (ref 0–1.5)
BILIRUB SERPL-MCNC: 0.6 MG/DL (ref 0–1.2)
BUN SERPL-MCNC: 14 MG/DL (ref 6–20)
BUN/CREAT SERPL: 15.9 (ref 7–25)
CALCIUM SPEC-SCNC: 9 MG/DL (ref 8.6–10.5)
CHLORIDE SERPL-SCNC: 105 MMOL/L (ref 98–107)
CHOLEST SERPL-MCNC: 169 MG/DL (ref 0–200)
CO2 SERPL-SCNC: 21.8 MMOL/L (ref 22–29)
CREAT SERPL-MCNC: 0.88 MG/DL (ref 0.57–1)
DEPRECATED RDW RBC AUTO: 38.4 FL (ref 37–54)
EGFRCR SERPLBLD CKD-EPI 2021: 77.2 ML/MIN/1.73
EOSINOPHIL # BLD AUTO: 0.18 10*3/MM3 (ref 0–0.4)
EOSINOPHIL NFR BLD AUTO: 2.4 % (ref 0.3–6.2)
ERYTHROCYTE [DISTWIDTH] IN BLOOD BY AUTOMATED COUNT: 13.5 % (ref 12.3–15.4)
GLOBULIN UR ELPH-MCNC: 2.9 GM/DL
GLUCOSE SERPL-MCNC: 91 MG/DL (ref 65–99)
HBA1C MFR BLD: 6 % (ref 4.8–5.6)
HCT VFR BLD AUTO: 40.3 % (ref 34–46.6)
HDLC SERPL-MCNC: 52 MG/DL (ref 40–60)
HGB BLD-MCNC: 13.1 G/DL (ref 12–15.9)
IMM GRANULOCYTES # BLD AUTO: 0.02 10*3/MM3 (ref 0–0.05)
IMM GRANULOCYTES NFR BLD AUTO: 0.3 % (ref 0–0.5)
LDLC SERPL CALC-MCNC: 100 MG/DL (ref 0–100)
LDLC/HDLC SERPL: 1.88 {RATIO}
LYMPHOCYTES # BLD AUTO: 1.8 10*3/MM3 (ref 0.7–3.1)
LYMPHOCYTES NFR BLD AUTO: 24.1 % (ref 19.6–45.3)
MCH RBC QN AUTO: 25.6 PG (ref 26.6–33)
MCHC RBC AUTO-ENTMCNC: 32.5 G/DL (ref 31.5–35.7)
MCV RBC AUTO: 78.9 FL (ref 79–97)
MONOCYTES # BLD AUTO: 0.59 10*3/MM3 (ref 0.1–0.9)
MONOCYTES NFR BLD AUTO: 7.9 % (ref 5–12)
NEUTROPHILS NFR BLD AUTO: 4.83 10*3/MM3 (ref 1.7–7)
NEUTROPHILS NFR BLD AUTO: 64.8 % (ref 42.7–76)
NRBC BLD AUTO-RTO: 0 /100 WBC (ref 0–0.2)
PLATELET # BLD AUTO: 284 10*3/MM3 (ref 140–450)
PMV BLD AUTO: 10.3 FL (ref 6–12)
POTASSIUM SERPL-SCNC: 4 MMOL/L (ref 3.5–5.2)
PROT SERPL-MCNC: 7 G/DL (ref 6–8.5)
RBC # BLD AUTO: 5.11 10*6/MM3 (ref 3.77–5.28)
SODIUM SERPL-SCNC: 139 MMOL/L (ref 136–145)
T4 FREE SERPL-MCNC: 1.18 NG/DL (ref 0.93–1.7)
TRIGL SERPL-MCNC: 95 MG/DL (ref 0–150)
TSH SERPL DL<=0.05 MIU/L-ACNC: 0.14 UIU/ML (ref 0.27–4.2)
VLDLC SERPL-MCNC: 17 MG/DL (ref 5–40)
WBC NRBC COR # BLD AUTO: 7.46 10*3/MM3 (ref 3.4–10.8)

## 2024-03-09 PROCEDURE — 84439 ASSAY OF FREE THYROXINE: CPT

## 2024-03-09 PROCEDURE — 83036 HEMOGLOBIN GLYCOSYLATED A1C: CPT

## 2024-03-09 PROCEDURE — 82306 VITAMIN D 25 HYDROXY: CPT

## 2024-03-09 PROCEDURE — 80061 LIPID PANEL: CPT

## 2024-03-09 PROCEDURE — 36415 COLL VENOUS BLD VENIPUNCTURE: CPT

## 2024-03-09 PROCEDURE — 80050 GENERAL HEALTH PANEL: CPT

## 2024-03-11 RX ORDER — OLMESARTAN MEDOXOMIL AND HYDROCHLOROTHIAZIDE 40/25 40; 25 MG/1; MG/1
1 TABLET ORAL DAILY
Qty: 90 TABLET | Refills: 1 | Status: SHIPPED | OUTPATIENT
Start: 2024-03-11

## 2024-03-11 NOTE — PROGRESS NOTES
Chief Complaint  Follow-up (6 month follow up, labs done. The patient would like to talk about weight loss. /)  Subjective    History of Present Illness  Carine Tobin is a 58 y.o. female  presents to [facility] for follow-up hypertension, hyperlipidemia, anxiety, GERD, vitamin D deficiency and prediabetes.     Specialist include:  Dr. Lu, cardiology. Dr. Lovett, pulmonology. Endocrinology-[facility], gastroenterologist-[facility]-for Crohn's.     Past Medical History:   Diagnosis Date    Anemia     Anxiety and depression     Anxiety disorder, unspecified     Asthma Nov2022    Recurring cough and asthma developed    Bronchitis     Bulging lumbar disc     Chronic bronchitis 11/2022    Been coughing since this time    Crohn's disease     Crohn's disease of small intestine without complication     Crohn's disease of small intestine without complication     DJD (degenerative joint disease)     Essential (primary) hypertension     Eustachian tube dysfunction     Fibroma of tongue     GERD (gastroesophageal reflux disease)     Hepatic cyst     Hyperlipidemia, unspecified     Impaired fasting glucose     Insomnia     Internal hemorrhoids     Multiple thyroid nodules     Nephrolithiasis     Other cerebrovascular disease     Palpitations     Pneumonia 01/2023    Fluid in right lung until April despite meds    Protrusion of lumbar intervertebral disc     L5-S1    Sinusitis     Stroke     Vitamin D deficiency, unspecified     Zinc deficiency 04/2012        Past Surgical History:   Procedure Laterality Date    COLONOSCOPY  02/01/2017    ENDOSCOPY AND COLONOSCOPY  10/16/2007    HYSTERECTOMY  03/2019    PARTIAL    OTHER SURGICAL HISTORY      FNA OF THYROID    THYROID SURGERY      FNA OF THYROID        Allergies   Allergen Reactions    Amoxicillin Hives and Unknown - Low Severity    Levofloxacin Nausea And Vomiting, Nausea Only and Unknown - Low Severity          Current Outpatient  "Medications:     albuterol sulfate  (90 Base) MCG/ACT inhaler, Inhale 2 puffs Every 4 (Four) Hours As Needed for Wheezing., Disp: 6.7 g, Rfl: 2    amLODIPine (NORVASC) 5 MG tablet, Take 1 tablet by mouth Daily., Disp: 90 tablet, Rfl: 1    aspirin 81 MG EC tablet, Take 1 tablet by mouth Daily., Disp: , Rfl:     atenolol (TENORMIN) 25 MG tablet, Take 1 tablet by mouth Daily., Disp: 90 tablet, Rfl: 1    atorvastatin (LIPITOR) 40 MG tablet, Take 1 tablet by mouth Daily., Disp: 90 tablet, Rfl: 1    benzonatate (TESSALON) 200 MG capsule, Take 1 capsule by mouth 3 (Three) Times a Day As Needed for Cough., Disp: 30 capsule, Rfl: 1    buPROPion XL (WELLBUTRIN XL) 150 MG 24 hr tablet, Take 1 tablet by mouth Daily., Disp: 90 tablet, Rfl: 1    CALCIUM CARBONATE-VIT D-MIN PO, Take 1 tablet by mouth Daily., Disp: , Rfl:     fexofenadine (ALLEGRA) 180 MG tablet, , Disp: , Rfl:     fluticasone (FLONASE) 50 MCG/ACT nasal spray, 2 sprays into the nostril(s) as directed by provider Daily., Disp: 1 g, Rfl: 5    multivitamin with minerals tablet tablet, Take 1 tablet by mouth Daily., Disp: , Rfl:     olmesartan-hydrochlorothiazide (BENICAR HCT) 40-25 MG per tablet, TAKE 1 TABLET BY MOUTH EVERY DAY, Disp: 90 tablet, Rfl: 1    pantoprazole (PROTONIX) 40 MG EC tablet, Take 1 tablet by mouth Daily., Disp: 90 tablet, Rfl: 1    predniSONE (DELTASONE) 20 MG tablet, Take 2 tablets by mouth Daily., Disp: 14 tablet, Rfl: 0    Probiotic Product (Florajen Digestion) capsule, Take 1 capsule by mouth Every 12 (Twelve) Hours., Disp: , Rfl:     vitamin D (ERGOCALCIFEROL) 1.25 MG (14888 UT) capsule capsule, Take 1 capsule by mouth Every 7 (Seven) Days., Disp: 12 capsule, Rfl: 1    Objective   /70 (BP Location: Right arm, Patient Position: Sitting, Cuff Size: Large Adult)   Pulse 75   Temp 97.3 °F (36.3 °C) (Temporal)   Ht 172.7 cm (67.99\")   Wt 118 kg (261 lb 3.2 oz)   SpO2 98%   BMI 39.73 kg/m²    Estimated body mass index is 39.73 " "kg/m² as calculated from the following:    Height as of this encounter: 172.7 cm (67.99\").    Weight as of this encounter: 118 kg (261 lb 3.2 oz).   Physical Exam  Vitals reviewed.   Constitutional:       General: She is not in acute distress.  HENT:      Head: Normocephalic and atraumatic.   Neck:      Comments: No thyroid enlargement  Cardiovascular:      Rate and Rhythm: Normal rate and regular rhythm.   Pulmonary:      Effort: Pulmonary effort is normal.      Breath sounds: Normal breath sounds. No wheezing, rhonchi or rales.   Musculoskeletal:      Right lower le+ Pitting Edema present.      Left lower le+ Pitting Edema present.   Lymphadenopathy:      Cervical: No cervical adenopathy.   Skin:     General: Skin is warm and dry.   Neurological:      General: No focal deficit present.      Mental Status: She is alert.   Psychiatric:         Thought Content: Thought content normal.        Result Review :  The following data was reviewed by: JENNYFER Plascencia on 2024:  Common labs          2023    18:39 2023    11:26 3/9/2024    10:12   Common Labs   Glucose  104  91    BUN  18  14    Creatinine 1.00  0.83  0.88    Sodium  137  139    Potassium  3.8  4.0    Chloride  102  105    Calcium  9.1  9.0    Albumin  4.3  4.1    Total Bilirubin  0.5  0.6    Alkaline Phosphatase  73  72    AST (SGOT)  21  23    ALT (SGPT)  25  19    WBC  5.36  7.46    Hemoglobin  13.9  13.1    Hematocrit  41.9  40.3    Platelets  284  284    Total Cholesterol  172  169    Triglycerides  89  95    HDL Cholesterol  52  52    LDL Cholesterol   104  100    Hemoglobin A1C  5.80  6.00                  Assessment and Plan   Diagnoses and all orders for this visit:    1. Essential hypertension (Primary)  -     Comprehensive Metabolic Panel; Future  -     CBC & Differential; Future  -     Cancel: T4, Free; Future  -     Cancel: TSH; Future  -     Folate; Future  -     Vitamin B12; Future    2. Anxiety disorder, unspecified " type    3. Hyperlipidemia, unspecified hyperlipidemia type  -     Lipid Panel; Future    4. Prediabetes  -     Hemoglobin A1c; Future  -     Folate; Future  -     Vitamin B12; Future    5. Vitamin D deficiency  -     Vitamin D,25-Hydroxy; Future    6. Gastroesophageal reflux disease, unspecified whether esophagitis present  -     Folate; Future  -     Vitamin B12; Future      Essential hypertension:  Blood pressure well-controlled on amlodipine 5 mg daily, atenolol 25 mg daily and olmesartan-hydrochlorothiazide 40-25 mg daily. Continue current plan.     Anxiety disorder: Stable and to continue wellbutrin  mg daily.     Hyperlipidemia:  Stable atorvastatin 40 mg daily and to continue.      Prediabetes:  HA1C is 6.00. Continue lifestyle modifications.  Monitor.     Vitamin D deficiency: Level normal. Continue supplement.     GERD: Stable on Protonix 40 mg  and to continue current treatment plan.     Patient was given instructions and counseling regarding her condition or for health maintenance advice. Please see specific information pulled into the AVS if appropriate.     Follow Up   Return in about 6 months (around 9/6/2024) for Annual physical.    Dictated Utilizing Dragon Dictation.  Please note that portions of this note were completed with a voice recognition program.  Part of this note may be an electronic transcription/translation of spoken language to printed text using the Dragon Dictation System.    JENNYFER Plascencia

## 2024-03-13 ENCOUNTER — OFFICE VISIT (OUTPATIENT)
Dept: INTERNAL MEDICINE | Age: 59
End: 2024-03-13
Payer: COMMERCIAL

## 2024-03-13 VITALS
SYSTOLIC BLOOD PRESSURE: 120 MMHG | HEART RATE: 75 BPM | HEIGHT: 68 IN | WEIGHT: 261.2 LBS | OXYGEN SATURATION: 98 % | DIASTOLIC BLOOD PRESSURE: 70 MMHG | TEMPERATURE: 97.3 F | BODY MASS INDEX: 39.59 KG/M2

## 2024-03-13 DIAGNOSIS — R73.03 PREDIABETES: ICD-10-CM

## 2024-03-13 DIAGNOSIS — F41.9 ANXIETY DISORDER, UNSPECIFIED TYPE: ICD-10-CM

## 2024-03-13 DIAGNOSIS — E55.9 VITAMIN D DEFICIENCY: ICD-10-CM

## 2024-03-13 DIAGNOSIS — E78.5 HYPERLIPIDEMIA, UNSPECIFIED HYPERLIPIDEMIA TYPE: ICD-10-CM

## 2024-03-13 DIAGNOSIS — K21.9 GASTROESOPHAGEAL REFLUX DISEASE, UNSPECIFIED WHETHER ESOPHAGITIS PRESENT: ICD-10-CM

## 2024-03-13 DIAGNOSIS — I10 ESSENTIAL HYPERTENSION: Primary | ICD-10-CM

## 2024-03-13 PROCEDURE — 99214 OFFICE O/P EST MOD 30 MIN: CPT | Performed by: NURSE PRACTITIONER

## 2024-03-14 DIAGNOSIS — E78.5 HYPERLIPIDEMIA, UNSPECIFIED HYPERLIPIDEMIA TYPE: ICD-10-CM

## 2024-03-14 DIAGNOSIS — K21.9 GASTROESOPHAGEAL REFLUX DISEASE, UNSPECIFIED WHETHER ESOPHAGITIS PRESENT: Chronic | ICD-10-CM

## 2024-03-14 RX ORDER — ATORVASTATIN CALCIUM 40 MG/1
40 TABLET, FILM COATED ORAL DAILY
Qty: 90 TABLET | Refills: 1 | Status: SHIPPED | OUTPATIENT
Start: 2024-03-14

## 2024-03-14 RX ORDER — PANTOPRAZOLE SODIUM 40 MG/1
40 TABLET, DELAYED RELEASE ORAL DAILY
Qty: 90 TABLET | Refills: 1 | Status: SHIPPED | OUTPATIENT
Start: 2024-03-14

## 2024-04-17 RX ORDER — BUPROPION HYDROCHLORIDE 150 MG/1
150 TABLET ORAL DAILY
Qty: 90 TABLET | Refills: 1 | Status: SHIPPED | OUTPATIENT
Start: 2024-04-17

## 2024-06-21 ENCOUNTER — OFFICE VISIT (OUTPATIENT)
Dept: CARDIOLOGY | Facility: CLINIC | Age: 59
End: 2024-06-21
Payer: COMMERCIAL

## 2024-06-21 VITALS
HEIGHT: 67 IN | BODY MASS INDEX: 40.18 KG/M2 | WEIGHT: 256 LBS | DIASTOLIC BLOOD PRESSURE: 77 MMHG | HEART RATE: 79 BPM | SYSTOLIC BLOOD PRESSURE: 126 MMHG

## 2024-06-21 DIAGNOSIS — I10 HYPERTENSION, ESSENTIAL: ICD-10-CM

## 2024-06-21 DIAGNOSIS — E66.01 MORBID (SEVERE) OBESITY DUE TO EXCESS CALORIES: Primary | ICD-10-CM

## 2024-06-21 DIAGNOSIS — E78.2 HYPERLIPEMIA, MIXED: ICD-10-CM

## 2024-06-21 NOTE — PROGRESS NOTES
Chief Complaint  Dyspnea on exertion (Follow Up), Rapid Heart Rate, and Shortness of Breath    Subjective            Carine Tobin presents to [facility] CARDIOLOGY  Shortness of Breath  Pertinent negatives include no chest pain.       The patient is here for follow-up evaluation management of essential hypertension, obesity, mixed hyperlipidemia.  Overall she is doing relatively well.  She is trying to lose weight.  She is compliant with her medical therapy.  She does have some tachycardia with exercise but not at rest, and some mild shortness of breath with exertion.    PMH  Past Medical History:   Diagnosis Date    Anemia     Anxiety and depression     Anxiety disorder, unspecified     Asthma Nov2022    Recurring cough and asthma developed    Bronchitis     Bulging lumbar disc     Chronic bronchitis 11/2022    Been coughing since this time    Crohn's disease     Crohn's disease of small intestine without complication     Crohn's disease of small intestine without complication     DJD (degenerative joint disease)     Essential (primary) hypertension     Eustachian tube dysfunction     Fibroma of tongue     GERD (gastroesophageal reflux disease)     Hepatic cyst     Hyperlipidemia, unspecified     Impaired fasting glucose     Insomnia     Internal hemorrhoids     Multiple thyroid nodules     Nephrolithiasis     Other cerebrovascular disease     Palpitations     Pneumonia 01/2023    Fluid in right lung until April despite meds    Protrusion of lumbar intervertebral disc     L5-S1    Sinusitis     Stroke     Vitamin D deficiency, unspecified     Zinc deficiency 04/2012         SURGICALHX  Past Surgical History:   Procedure Laterality Date    COLONOSCOPY  02/01/2017    ENDOSCOPY AND COLONOSCOPY  10/16/2007    HYSTERECTOMY  03/2019    PARTIAL    OTHER SURGICAL HISTORY      FNA OF THYROID    THYROID SURGERY      FNA OF THYROID        SOC  Social History     Socioeconomic History    Marital status:     Tobacco Use    Smoking status: Never     Passive exposure: Never    Smokeless tobacco: Never   Vaping Use    Vaping status: Never Used   Substance and Sexual Activity    Alcohol use: Yes     Alcohol/week: 1.0 standard drink of alcohol     Types: 1 Glasses of wine per week     Comment: rarely    Drug use: Never    Sexual activity: Yes     Partners: Male     Birth control/protection: Vasectomy, Hysterectomy, Surgical         FAMHX  Family History   Problem Relation Age of Onset    Cancer Mother         Kidney removed    Hypertension Mother         Pacemaker; high triglycerides    Heart disease Mother     Diabetes Father         Type 2 -diagnosed at older age    Heart failure Father         Multiple Organ failure - kidney and liver    Heart disease Father     Heart disease Maternal Grandmother           ALLERGY  Allergies   Allergen Reactions    Amoxicillin Hives and Unknown - Low Severity    Levofloxacin Nausea And Vomiting, Nausea Only and Unknown - Low Severity        MEDSCURRENT    Current Outpatient Medications:     albuterol sulfate  (90 Base) MCG/ACT inhaler, Inhale 2 puffs Every 4 (Four) Hours As Needed for Wheezing., Disp: 6.7 g, Rfl: 2    amLODIPine (NORVASC) 5 MG tablet, Take 1 tablet by mouth Daily., Disp: 90 tablet, Rfl: 1    aspirin 81 MG EC tablet, Take 1 tablet by mouth Daily., Disp: , Rfl:     atenolol (TENORMIN) 25 MG tablet, Take 1 tablet by mouth Daily., Disp: 90 tablet, Rfl: 1    atorvastatin (LIPITOR) 40 MG tablet, TAKE 1 TABLET BY MOUTH EVERY DAY, Disp: 90 tablet, Rfl: 1    benzonatate (TESSALON) 200 MG capsule, Take 1 capsule by mouth 3 (Three) Times a Day As Needed for Cough., Disp: 30 capsule, Rfl: 1    buPROPion XL (WELLBUTRIN XL) 150 MG 24 hr tablet, TAKE 1 TABLET BY MOUTH EVERY DAY, Disp: 90 tablet, Rfl: 1    CALCIUM CARBONATE-VIT D-MIN PO, Take 1 tablet by mouth Daily., Disp: , Rfl:     fexofenadine (ALLEGRA) 180 MG tablet, , Disp: , Rfl:     fluticasone (FLONASE) 50  "MCG/ACT nasal spray, 2 sprays into the nostril(s) as directed by provider Daily., Disp: 1 g, Rfl: 5    multivitamin with minerals tablet tablet, Take 1 tablet by mouth Daily., Disp: , Rfl:     olmesartan-hydrochlorothiazide (BENICAR HCT) 40-25 MG per tablet, TAKE 1 TABLET BY MOUTH EVERY DAY, Disp: 90 tablet, Rfl: 1    pantoprazole (PROTONIX) 40 MG EC tablet, TAKE 1 TABLET BY MOUTH EVERY DAY, Disp: 90 tablet, Rfl: 1    vitamin D (ERGOCALCIFEROL) 1.25 MG (96359 UT) capsule capsule, Take 1 capsule by mouth Every 7 (Seven) Days., Disp: 12 capsule, Rfl: 1    predniSONE (DELTASONE) 20 MG tablet, Take 2 tablets by mouth Daily., Disp: 14 tablet, Rfl: 0    Probiotic Product (Florajen Digestion) capsule, Take 1 capsule by mouth Every 12 (Twelve) Hours., Disp: , Rfl:       Review of Systems   Cardiovascular:  Positive for dyspnea on exertion. Negative for chest pain.   Respiratory:  Positive for shortness of breath.         Objective     /77   Pulse 79   Ht 170.2 cm (67\")   Wt 116 kg (256 lb)   BMI 40.10 kg/m²       General Appearance:   well developed  well nourished  HENT:   oropharynx moist  lips not cyanotic  Neck:  thyroid not enlarged  supple  Respiratory:  no respiratory distress  normal breath sounds  no rales  Cardiovascular:  no jugular venous distention  regular rhythm  apical impulse normal  S1 normal, S2 normal  no S3, no S4   no murmur  no rub, no thrill  carotid pulses normal; no bruit  pedal pulses normal  lower extremity edema: none    Musculoskeletal:  no clubbing of fingers.   normocephalic, head atraumatic  Skin:   warm, dry  Psychiatric:  judgement and insight appropriate  normal mood and affect      Result Review :     The following data was reviewed by: Nasim Lu MD on 06/21/2024:    CMP          9/2/2023    11:26 3/9/2024    10:12   CMP   Glucose 104  91    BUN 18  14    Creatinine 0.83  0.88    EGFR 83.4  77.2    Sodium 137  139    Potassium 3.8  4.0    Chloride 102  105  "   Calcium 9.1  9.0    Total Protein 7.1  7.0    Albumin 4.3  4.1    Globulin 2.8  2.9    Total Bilirubin 0.5  0.6    Alkaline Phosphatase 73  72    AST (SGOT) 21  23    ALT (SGPT) 25  19    Albumin/Globulin Ratio 1.5  1.4    BUN/Creatinine Ratio 21.7  15.9    Anion Gap 12.2  12.2      CBC          9/2/2023    11:26 3/9/2024    10:12   CBC   WBC 5.36  7.46    RBC 5.20  5.11    Hemoglobin 13.9  13.1    Hematocrit 41.9  40.3    MCV 80.6  78.9    MCH 26.7  25.6    MCHC 33.2  32.5    RDW 13.6  13.5    Platelets 284  284      Lipid Panel          9/2/2023    11:26 3/9/2024    10:12   Lipid Panel   Total Cholesterol 172  169    Triglycerides 89  95    HDL Cholesterol 52  52    VLDL Cholesterol 16  17    LDL Cholesterol  104  100    LDL/HDL Ratio 1.97  1.88      TSH          9/2/2023    11:26 10/26/2023    10:29 3/9/2024    10:12   TSH   TSH 0.212  0.08     0.139       Details          This result is from an external source.                    Procedures                Assessment and Plan        ASSESSMENT:  Encounter Diagnoses   Name Primary?    Morbid (severe) obesity due to excess calories Yes    Hypertension, essential     Hyperlipemia, mixed          PLAN:    1.  Morbid obesity due to excess calories-weight loss counseling, I agree with medically supervised weight loss in her case  2.  Essential hypertension-controlled, continue current medical therapy, monitor at home  3.  Mixed hyperlipidemia-stable on statin therapy    The patient will follow-up annually unless problems arise          Patient was given instructions and counseling regarding her condition or for health maintenance advice. Please see specific information pulled into the AVS if appropriate.             DAVID Lu MD  6/21/2024    09:54 EDT

## 2024-06-29 DIAGNOSIS — E78.5 HYPERLIPIDEMIA, UNSPECIFIED HYPERLIPIDEMIA TYPE: ICD-10-CM

## 2024-06-29 DIAGNOSIS — K21.9 GASTROESOPHAGEAL REFLUX DISEASE, UNSPECIFIED WHETHER ESOPHAGITIS PRESENT: Chronic | ICD-10-CM

## 2024-07-01 RX ORDER — AMLODIPINE BESYLATE 5 MG/1
5 TABLET ORAL DAILY
Qty: 90 TABLET | Refills: 1 | Status: SHIPPED | OUTPATIENT
Start: 2024-07-01

## 2024-07-01 RX ORDER — ATENOLOL 25 MG/1
25 TABLET ORAL DAILY
Qty: 90 TABLET | Refills: 1 | Status: SHIPPED | OUTPATIENT
Start: 2024-07-01

## 2024-07-01 RX ORDER — OLMESARTAN MEDOXOMIL AND HYDROCHLOROTHIAZIDE 40/25 40; 25 MG/1; MG/1
1 TABLET ORAL DAILY
Qty: 90 TABLET | Refills: 1 | Status: SHIPPED | OUTPATIENT
Start: 2024-07-01

## 2024-07-01 RX ORDER — ATORVASTATIN CALCIUM 40 MG/1
40 TABLET, FILM COATED ORAL DAILY
Qty: 90 TABLET | Refills: 1 | Status: SHIPPED | OUTPATIENT
Start: 2024-07-01

## 2024-07-01 RX ORDER — FLUTICASONE PROPIONATE 50 MCG
2 SPRAY, SUSPENSION (ML) NASAL DAILY
Qty: 48 ML | Refills: 1 | Status: SHIPPED | OUTPATIENT
Start: 2024-07-01

## 2024-07-01 RX ORDER — PANTOPRAZOLE SODIUM 40 MG/1
40 TABLET, DELAYED RELEASE ORAL DAILY
Qty: 90 TABLET | Refills: 1 | Status: SHIPPED | OUTPATIENT
Start: 2024-07-01

## 2024-09-24 RX ORDER — ERGOCALCIFEROL 1.25 MG/1
50000 CAPSULE, LIQUID FILLED ORAL
Qty: 12 CAPSULE | Refills: 1 | Status: SHIPPED | OUTPATIENT
Start: 2024-09-24

## 2024-09-25 RX ORDER — BUPROPION HYDROCHLORIDE 150 MG/1
150 TABLET ORAL DAILY
Qty: 90 TABLET | Refills: 1 | Status: SHIPPED | OUTPATIENT
Start: 2024-09-25

## 2024-10-12 ENCOUNTER — LAB (OUTPATIENT)
Dept: LAB | Facility: HOSPITAL | Age: 59
End: 2024-10-12
Payer: COMMERCIAL

## 2024-10-12 DIAGNOSIS — R73.03 PREDIABETES: ICD-10-CM

## 2024-10-12 DIAGNOSIS — K21.9 GASTROESOPHAGEAL REFLUX DISEASE, UNSPECIFIED WHETHER ESOPHAGITIS PRESENT: ICD-10-CM

## 2024-10-12 DIAGNOSIS — E55.9 VITAMIN D DEFICIENCY: ICD-10-CM

## 2024-10-12 DIAGNOSIS — I10 ESSENTIAL HYPERTENSION: ICD-10-CM

## 2024-10-12 DIAGNOSIS — E78.5 HYPERLIPIDEMIA, UNSPECIFIED HYPERLIPIDEMIA TYPE: ICD-10-CM

## 2024-10-12 LAB
25(OH)D3 SERPL-MCNC: 26.1 NG/ML (ref 30–100)
ALBUMIN SERPL-MCNC: 4 G/DL (ref 3.5–5.2)
ALBUMIN/GLOB SERPL: 1.6 G/DL
ALP SERPL-CCNC: 70 U/L (ref 39–117)
ALT SERPL W P-5'-P-CCNC: 24 U/L (ref 1–33)
ANION GAP SERPL CALCULATED.3IONS-SCNC: 9.6 MMOL/L (ref 5–15)
AST SERPL-CCNC: 23 U/L (ref 1–32)
BASOPHILS # BLD AUTO: 0.05 10*3/MM3 (ref 0–0.2)
BASOPHILS NFR BLD AUTO: 0.7 % (ref 0–1.5)
BILIRUB SERPL-MCNC: 0.4 MG/DL (ref 0–1.2)
BUN SERPL-MCNC: 10 MG/DL (ref 6–20)
BUN/CREAT SERPL: 14.7 (ref 7–25)
CALCIUM SPEC-SCNC: 9.2 MG/DL (ref 8.6–10.5)
CHLORIDE SERPL-SCNC: 104 MMOL/L (ref 98–107)
CHOLEST SERPL-MCNC: 160 MG/DL (ref 0–200)
CO2 SERPL-SCNC: 23.4 MMOL/L (ref 22–29)
CREAT SERPL-MCNC: 0.68 MG/DL (ref 0.57–1)
DEPRECATED RDW RBC AUTO: 42.2 FL (ref 37–54)
EGFRCR SERPLBLD CKD-EPI 2021: 102.4 ML/MIN/1.73
EOSINOPHIL # BLD AUTO: 0 10*3/MM3 (ref 0–0.4)
EOSINOPHIL NFR BLD AUTO: 0 % (ref 0.3–6.2)
ERYTHROCYTE [DISTWIDTH] IN BLOOD BY AUTOMATED COUNT: 14 % (ref 12.3–15.4)
FOLATE SERPL-MCNC: 11.9 NG/ML (ref 4.78–24.2)
GLOBULIN UR ELPH-MCNC: 2.5 GM/DL
GLUCOSE SERPL-MCNC: 100 MG/DL (ref 65–99)
HBA1C MFR BLD: 5.3 % (ref 4.8–5.6)
HCT VFR BLD AUTO: 40.5 % (ref 34–46.6)
HDLC SERPL-MCNC: 56 MG/DL (ref 40–60)
HGB BLD-MCNC: 13.1 G/DL (ref 12–15.9)
IMM GRANULOCYTES # BLD AUTO: 0.03 10*3/MM3 (ref 0–0.05)
IMM GRANULOCYTES NFR BLD AUTO: 0.4 % (ref 0–0.5)
LDLC SERPL CALC-MCNC: 90 MG/DL (ref 0–100)
LDLC/HDLC SERPL: 1.61 {RATIO}
LYMPHOCYTES # BLD AUTO: 1.8 10*3/MM3 (ref 0.7–3.1)
LYMPHOCYTES NFR BLD AUTO: 24.1 % (ref 19.6–45.3)
MCH RBC QN AUTO: 26.9 PG (ref 26.6–33)
MCHC RBC AUTO-ENTMCNC: 32.3 G/DL (ref 31.5–35.7)
MCV RBC AUTO: 83.2 FL (ref 79–97)
MONOCYTES # BLD AUTO: 0.63 10*3/MM3 (ref 0.1–0.9)
MONOCYTES NFR BLD AUTO: 8.4 % (ref 5–12)
NEUTROPHILS NFR BLD AUTO: 4.97 10*3/MM3 (ref 1.7–7)
NEUTROPHILS NFR BLD AUTO: 66.4 % (ref 42.7–76)
NRBC BLD AUTO-RTO: 0 /100 WBC (ref 0–0.2)
PLATELET # BLD AUTO: 259 10*3/MM3 (ref 140–450)
PMV BLD AUTO: 10.3 FL (ref 6–12)
POTASSIUM SERPL-SCNC: 4.1 MMOL/L (ref 3.5–5.2)
PROT SERPL-MCNC: 6.5 G/DL (ref 6–8.5)
RBC # BLD AUTO: 4.87 10*6/MM3 (ref 3.77–5.28)
SODIUM SERPL-SCNC: 137 MMOL/L (ref 136–145)
TRIGL SERPL-MCNC: 70 MG/DL (ref 0–150)
VIT B12 BLD-MCNC: 450 PG/ML (ref 211–946)
VLDLC SERPL-MCNC: 14 MG/DL (ref 5–40)
WBC NRBC COR # BLD AUTO: 7.48 10*3/MM3 (ref 3.4–10.8)

## 2024-10-12 PROCEDURE — 85025 COMPLETE CBC W/AUTO DIFF WBC: CPT

## 2024-10-12 PROCEDURE — 84439 ASSAY OF FREE THYROXINE: CPT

## 2024-10-12 PROCEDURE — 82746 ASSAY OF FOLIC ACID SERUM: CPT

## 2024-10-12 PROCEDURE — 36415 COLL VENOUS BLD VENIPUNCTURE: CPT

## 2024-10-12 PROCEDURE — 80053 COMPREHEN METABOLIC PANEL: CPT

## 2024-10-12 PROCEDURE — 80061 LIPID PANEL: CPT

## 2024-10-12 PROCEDURE — 80050 GENERAL HEALTH PANEL: CPT

## 2024-10-12 PROCEDURE — 82607 VITAMIN B-12: CPT

## 2024-10-12 PROCEDURE — 83036 HEMOGLOBIN GLYCOSYLATED A1C: CPT

## 2024-10-12 PROCEDURE — 84481 FREE ASSAY (FT-3): CPT

## 2024-10-12 PROCEDURE — 82306 VITAMIN D 25 HYDROXY: CPT

## 2024-10-14 ENCOUNTER — TRANSCRIBE ORDERS (OUTPATIENT)
Dept: ADMINISTRATIVE | Facility: HOSPITAL | Age: 59
End: 2024-10-14
Payer: COMMERCIAL

## 2024-10-14 ENCOUNTER — LAB (OUTPATIENT)
Dept: LAB | Facility: HOSPITAL | Age: 59
End: 2024-10-14
Payer: COMMERCIAL

## 2024-10-14 DIAGNOSIS — R73.01 IMPAIRED FASTING GLUCOSE: ICD-10-CM

## 2024-10-14 DIAGNOSIS — E04.2 NONTOXIC MULTINODULAR GOITER: Primary | ICD-10-CM

## 2024-10-14 DIAGNOSIS — E78.5 HYPERLIPIDEMIA, UNSPECIFIED HYPERLIPIDEMIA TYPE: ICD-10-CM

## 2024-10-14 DIAGNOSIS — E04.2 NONTOXIC MULTINODULAR GOITER: ICD-10-CM

## 2024-10-14 DIAGNOSIS — E55.9 VITAMIN D DEFICIENCY, UNSPECIFIED: ICD-10-CM

## 2024-10-14 LAB
T3FREE SERPL-MCNC: 3.4 PG/ML (ref 2–4.4)
T4 FREE SERPL-MCNC: 0.98 NG/DL (ref 0.92–1.68)
TSH SERPL DL<=0.05 MIU/L-ACNC: 0.37 UIU/ML (ref 0.27–4.2)

## 2024-11-10 PROBLEM — R73.03 PREDIABETES: Status: ACTIVE | Noted: 2024-11-10

## 2024-11-10 NOTE — PROGRESS NOTES
Chief Complaint  Follow-up (58 year old female here today for a follow up. ) and Annual Exam (Pt is due for her annual exam and a lab review)    Subjective      History of Present Illness  The patient is a 58-year-old female who presents today for her annual wellness visit and follow-up for hypertension, hyperlipidemia, prediabetes, anxiety, vitamin D deficiency, and GERD.    She is currently on a regimen of olmesartan/hydrochlorothiazide (Benicar) 40/25 mg, atenolol 25 mg, and amlodipine 5 mg daily for her hypertension. For her hyperlipidemia, she is taking atorvastatin 40 mg daily. She has been diagnosed with prediabetes, but her recent A1c level was 5.3.    She has chosen not to take Wegovy, despite having a prescription, and is attempting to lose weight independently. Her weight has fluctuated, but she recently weighed in at 249 pounds.    For her anxiety, she is taking bupropion  mg daily, which she reports is effective.    She has discontinued her pantoprazole 40 mg daily for reflux, as advised by her gastroenterologist.    She is taking vitamin D once a week and is considering increasing the dosage to twice a week.    She has an upcoming appointment with her endocrinologist, who has ordered an ultrasound and uptake scan of her thyroid. The results indicated that one side of her thyroid, which has large nodules, is producing more hormones than the other side. She has undergone a procedure involving radial waves to reduce the size of the nodule.    She has had a partial hysterectomy due to fibroids and has had one Pap smear since then.       Past Medical History:   Diagnosis Date    Anemia     Anxiety and depression     Anxiety disorder, unspecified     Asthma Nov2022    Recurring cough and asthma developed    Bronchitis     Bulging lumbar disc     Chronic bronchitis 11/2022    Been coughing since this time    Crohn's disease     Crohn's disease of small intestine without complication     Crohn's disease  of small intestine without complication     DJD (degenerative joint disease)     Essential (primary) hypertension     Eustachian tube dysfunction     Fibroma of tongue     GERD (gastroesophageal reflux disease)     Hepatic cyst     Hyperlipidemia, unspecified     Impaired fasting glucose     Insomnia     Internal hemorrhoids     Multiple thyroid nodules     Nephrolithiasis     Other cerebrovascular disease     Palpitations     Pneumonia 01/2023    Fluid in right lung until April despite meds    Protrusion of lumbar intervertebral disc     L5-S1    Sinusitis     Stroke     Vitamin D deficiency, unspecified     Zinc deficiency 04/2012        Past Surgical History:   Procedure Laterality Date    COLONOSCOPY  02/01/2017    ENDOSCOPY AND COLONOSCOPY  10/16/2007    HYSTERECTOMY  03/2019    PARTIAL    OTHER SURGICAL HISTORY      FNA OF THYROID    THYROID SURGERY      FNA OF THYROID        Social History     Tobacco Use   Smoking Status Never    Passive exposure: Never   Smokeless Tobacco Never        Patient Care Team:  Xenia Carpenter APRN as PCP - General (Nurse Practitioner)  Hayden Monet MD as Consulting Physician (Gastroenterology)  Davida Guzman MD as Consulting Physician (Endocrinology)    Allergies   Allergen Reactions    Amoxicillin Hives and Unknown - Low Severity    Levofloxacin Nausea And Vomiting, Nausea Only and Unknown - Low Severity          Current Outpatient Medications:     albuterol sulfate  (90 Base) MCG/ACT inhaler, Inhale 2 puffs Every 4 (Four) Hours As Needed for Wheezing., Disp: 6.7 g, Rfl: 2    amLODIPine (NORVASC) 5 MG tablet, Take 1 tablet by mouth Daily., Disp: 90 tablet, Rfl: 1    aspirin 81 MG EC tablet, Take 1 tablet by mouth Daily., Disp: , Rfl:     atenolol (TENORMIN) 25 MG tablet, Take 1 tablet by mouth Daily., Disp: 90 tablet, Rfl: 1    atorvastatin (LIPITOR) 40 MG tablet, Take 1 tablet by mouth Daily., Disp: 90 tablet, Rfl: 1    buPROPion XL (WELLBUTRIN XL) 150  "MG 24 hr tablet, Take 1 tablet by mouth Daily., Disp: 90 tablet, Rfl: 1    CALCIUM CARBONATE-VIT D-MIN PO, Take 1 tablet by mouth Daily., Disp: , Rfl:     fluticasone (FLONASE) 50 MCG/ACT nasal spray, SPRAY 2 SPRAYS INTO THE NOSTRIL AS DIRECTED BY PROVIDER DAILY., Disp: 48 mL, Rfl: 1    multivitamin with minerals tablet tablet, Take 1 tablet by mouth Daily., Disp: , Rfl:     olmesartan-hydrochlorothiazide (BENICAR HCT) 40-25 MG per tablet, Take 1 tablet by mouth Daily., Disp: 90 tablet, Rfl: 1    [START ON 11/14/2024] vitamin D (ERGOCALCIFEROL) 1.25 MG (90079 UT) capsule capsule, Take 1 capsule by mouth 2 (Two) Times a Week., Disp: 26 capsule, Rfl: 1    Objective     Vitals:    11/12/24 0803   BP: 128/62   BP Location: Left arm   Patient Position: Sitting   Pulse: 74   Resp: 18   Temp: 98.6 °F (37 °C)   TempSrc: Temporal   SpO2: 99%   Weight: 115 kg (253 lb)   Height: 170.2 cm (67.01\")        Wt Readings from Last 3 Encounters:   11/12/24 115 kg (253 lb)   06/21/24 116 kg (256 lb)   03/13/24 118 kg (261 lb 3.2 oz)        BP Readings from Last 3 Encounters:   11/12/24 128/62   06/21/24 126/77   03/13/24 120/70        Physical Exam  Vital Signs  Weight is 253. Blood pressure is 128/62.    Physical Exam  Vitals reviewed.   Constitutional:       General: She is not in acute distress.  HENT:      Head: Normocephalic and atraumatic.   Eyes:      Conjunctiva/sclera: Conjunctivae normal.   Cardiovascular:      Rate and Rhythm: Normal rate and regular rhythm.      Heart sounds: Normal heart sounds.   Pulmonary:      Effort: Pulmonary effort is normal.      Breath sounds: Normal breath sounds. No wheezing, rhonchi or rales.   Abdominal:      General: There is no distension.      Palpations: Abdomen is soft. There is no mass.      Tenderness: There is no abdominal tenderness.   Musculoskeletal:      Right lower leg: No edema.      Left lower leg: No edema.   Lymphadenopathy:      Cervical: No cervical adenopathy.   Skin:     " General: Skin is warm and dry.   Neurological:      General: No focal deficit present.      Mental Status: She is alert.   Psychiatric:         Mood and Affect: Mood normal.         Thought Content: Thought content normal.                Result Review   The following data was reviewed by: JENNYFER Plascencia on 10/18/2024:  [x]  Tests & Results  []  Hospitalization/Emergency Department/Urgent Care  []  Internal/External Consultant Notes       Assessment and Plan     Diagnoses and all orders for this visit:    1. Annual physical exam (Primary)  -     Comprehensive Metabolic Panel; Future  -     CBC & Differential; Future  -     Lipid Panel; Future  -     TSH Rfx On Abnormal To Free T4; Future    2. Essential hypertension    3. Hyperlipidemia, unspecified hyperlipidemia type  -     atorvastatin (LIPITOR) 40 MG tablet; Take 1 tablet by mouth Daily.  Dispense: 90 tablet; Refill: 1    4. Prediabetes  -     Hemoglobin A1c; Future    5. Anxiety disorder, unspecified type    6. Gastroesophageal reflux disease, unspecified whether esophagitis present    7. Vitamin D deficiency  -     Vitamin D,25-Hydroxy; Future    8. Need for shingles vaccine  -     Shingrix Vaccine    9. Need for influenza vaccination  -     vitamin D (ERGOCALCIFEROL) 1.25 MG (74159 UT) capsule capsule; Take 1 capsule by mouth 2 (Two) Times a Week.  Dispense: 26 capsule; Refill: 1    10. Encounter for screening mammogram for malignant neoplasm of breast  -     Mammo Screening Digital Tomosynthesis Bilateral With CAD; Future    Other orders  -     Fluzone >6mos ([id number])  -     buPROPion XL (WELLBUTRIN XL) 150 MG 24 hr tablet; Take 1 tablet by mouth Daily.  Dispense: 90 tablet; Refill: 1  -     olmesartan-hydrochlorothiazide (BENICAR HCT) 40-25 MG per tablet; Take 1 tablet by mouth Daily.  Dispense: 90 tablet; Refill: 1  -     atenolol (TENORMIN) 25 MG tablet; Take 1 tablet by mouth Daily.  Dispense: 90 tablet; Refill: 1  -     amLODIPine (NORVASC) 5 MG  tablet; Take 1 tablet by mouth Daily.  Dispense: 90 tablet; Refill: 1         Assessment & Plan  1. Hypertension.  Her blood pressure is well-controlled at 128/62 mmHg. She is currently on olmesartan hydrochlorothiazide (Benicar) 40-25 mg daily, atenolol 25 mg daily, and amlodipine 5 mg daily. She will continue her current medication regimen.    2. Hyperlipidemia.  Her lipid panel results are satisfactory. She is on atorvastatin 40 mg daily. She will continue her current medication regimen.    3. Prediabetes.  Her A1c has decreased from 6.0 to 5.3, indicating normal glycemic control. She has been advised to continue her current lifestyle changes, including weight management.    4. Anxiety.  She is on bupropion  mg once a day, which is effectively managing her symptoms. She will continue her current medication regimen.    5. Gastroesophageal Reflux Disease (GERD).  She has discontinued pantoprazole 40 mg daily after consulting with her GI doctor and successfully weaning off the medication. No further treatment is required at this time.    6. Vitamin D deficiency.  Her recent lab results from October 12 showed a vitamin D level of 26.1. A new prescription for vitamin D will be issued, to be taken twice weekly with meals to improve absorption.    7. Health Maintenance.  She will receive her influenza and shingles vaccines today. A mammogram will be scheduled as it is due. She does not require further Pap smears due to a partial hysterectomy for fibroids, with the last Pap smear on 09/05/2023 being negative. She is advised to report any symptoms of itching, which could indicate a yeast infection.    Follow-up  Return in 6 months for follow-up.     Patient was given instructions and counseling regarding her condition or for health maintenance advice. Please see specific information pulled into the AVS if appropriate.     Follow Up   Return in about 6 months (around 5/12/2025) for Recheck.    Patient or patient  representative verbalized consent for the use of Ambient Listening during the visit with  JENNYFER Plascencia for chart documentation. 11/12/2024  08:09 JENNYFER Soriano

## 2024-11-12 ENCOUNTER — OFFICE VISIT (OUTPATIENT)
Dept: INTERNAL MEDICINE | Age: 59
End: 2024-11-12
Payer: COMMERCIAL

## 2024-11-12 VITALS
RESPIRATION RATE: 18 BRPM | WEIGHT: 253 LBS | HEIGHT: 67 IN | OXYGEN SATURATION: 99 % | TEMPERATURE: 98.6 F | BODY MASS INDEX: 39.71 KG/M2 | DIASTOLIC BLOOD PRESSURE: 62 MMHG | SYSTOLIC BLOOD PRESSURE: 128 MMHG | HEART RATE: 74 BPM

## 2024-11-12 DIAGNOSIS — E78.5 HYPERLIPIDEMIA, UNSPECIFIED HYPERLIPIDEMIA TYPE: Chronic | ICD-10-CM

## 2024-11-12 DIAGNOSIS — Z12.31 ENCOUNTER FOR SCREENING MAMMOGRAM FOR MALIGNANT NEOPLASM OF BREAST: ICD-10-CM

## 2024-11-12 DIAGNOSIS — R73.03 PREDIABETES: ICD-10-CM

## 2024-11-12 DIAGNOSIS — Z23 NEED FOR INFLUENZA VACCINATION: ICD-10-CM

## 2024-11-12 DIAGNOSIS — E55.9 VITAMIN D DEFICIENCY: ICD-10-CM

## 2024-11-12 DIAGNOSIS — Z00.00 ANNUAL PHYSICAL EXAM: Primary | ICD-10-CM

## 2024-11-12 DIAGNOSIS — Z23 NEED FOR SHINGLES VACCINE: ICD-10-CM

## 2024-11-12 DIAGNOSIS — F41.9 ANXIETY DISORDER, UNSPECIFIED TYPE: Chronic | ICD-10-CM

## 2024-11-12 DIAGNOSIS — K21.9 GASTROESOPHAGEAL REFLUX DISEASE, UNSPECIFIED WHETHER ESOPHAGITIS PRESENT: ICD-10-CM

## 2024-11-12 DIAGNOSIS — I10 ESSENTIAL HYPERTENSION: Chronic | ICD-10-CM

## 2024-11-12 PROCEDURE — 90750 HZV VACC RECOMBINANT IM: CPT | Performed by: NURSE PRACTITIONER

## 2024-11-12 PROCEDURE — 90472 IMMUNIZATION ADMIN EACH ADD: CPT | Performed by: NURSE PRACTITIONER

## 2024-11-12 PROCEDURE — 99396 PREV VISIT EST AGE 40-64: CPT | Performed by: NURSE PRACTITIONER

## 2024-11-12 PROCEDURE — 90471 IMMUNIZATION ADMIN: CPT | Performed by: NURSE PRACTITIONER

## 2024-11-12 PROCEDURE — 90656 IIV3 VACC NO PRSV 0.5 ML IM: CPT | Performed by: NURSE PRACTITIONER

## 2024-11-12 RX ORDER — BUPROPION HYDROCHLORIDE 150 MG/1
150 TABLET ORAL DAILY
Qty: 90 TABLET | Refills: 1 | Status: SHIPPED | OUTPATIENT
Start: 2024-11-12

## 2024-11-12 RX ORDER — ERGOCALCIFEROL 1.25 MG/1
50000 CAPSULE, LIQUID FILLED ORAL 2 TIMES WEEKLY
Qty: 26 CAPSULE | Refills: 1 | Status: SHIPPED | OUTPATIENT
Start: 2024-11-14

## 2024-11-12 RX ORDER — AMLODIPINE BESYLATE 5 MG/1
5 TABLET ORAL DAILY
Qty: 90 TABLET | Refills: 1 | Status: SHIPPED | OUTPATIENT
Start: 2024-11-12

## 2024-11-12 RX ORDER — ATENOLOL 25 MG/1
25 TABLET ORAL DAILY
Qty: 90 TABLET | Refills: 1 | Status: SHIPPED | OUTPATIENT
Start: 2024-11-12

## 2024-11-12 RX ORDER — ATORVASTATIN CALCIUM 40 MG/1
40 TABLET, FILM COATED ORAL DAILY
Qty: 90 TABLET | Refills: 1 | Status: SHIPPED | OUTPATIENT
Start: 2024-11-12

## 2024-11-12 RX ORDER — OLMESARTAN MEDOXOMIL AND HYDROCHLOROTHIAZIDE 40/25 40; 25 MG/1; MG/1
1 TABLET ORAL DAILY
Qty: 90 TABLET | Refills: 1 | Status: SHIPPED | OUTPATIENT
Start: 2024-11-12

## 2024-12-23 RX ORDER — FLUTICASONE PROPIONATE 50 MCG
2 SPRAY, SUSPENSION (ML) NASAL DAILY
Qty: 16 G | Refills: 1 | Status: SHIPPED | OUTPATIENT
Start: 2024-12-23

## 2025-01-02 ENCOUNTER — HOSPITAL ENCOUNTER (OUTPATIENT)
Dept: MAMMOGRAPHY | Facility: HOSPITAL | Age: 60
Discharge: HOME | End: 2025-01-02
Admitting: NURSE PRACTITIONER
Payer: COMMERCIAL

## 2025-01-02 DIAGNOSIS — Z12.31 ENCOUNTER FOR SCREENING MAMMOGRAM FOR MALIGNANT NEOPLASM OF BREAST: ICD-10-CM

## 2025-01-02 PROCEDURE — 77063 BREAST TOMOSYNTHESIS BI: CPT

## 2025-01-02 PROCEDURE — 77067 SCR MAMMO BI INCL CAD: CPT

## 2025-01-20 ENCOUNTER — OFFICE VISIT (OUTPATIENT)
Dept: INTERNAL MEDICINE | Age: 60
End: 2025-01-20
Payer: COMMERCIAL

## 2025-01-20 VITALS
BODY MASS INDEX: 39.87 KG/M2 | TEMPERATURE: 97.5 F | HEIGHT: 67 IN | OXYGEN SATURATION: 98 % | HEART RATE: 89 BPM | DIASTOLIC BLOOD PRESSURE: 90 MMHG | WEIGHT: 254 LBS | SYSTOLIC BLOOD PRESSURE: 130 MMHG

## 2025-01-20 DIAGNOSIS — J45.40 MODERATE PERSISTENT ASTHMA WITHOUT COMPLICATION: ICD-10-CM

## 2025-01-20 DIAGNOSIS — Z86.2 HISTORY OF ANEMIA: ICD-10-CM

## 2025-01-20 DIAGNOSIS — R06.02 SHORTNESS OF BREATH: Primary | ICD-10-CM

## 2025-01-20 DIAGNOSIS — I10 ESSENTIAL HYPERTENSION: ICD-10-CM

## 2025-01-20 LAB
ANION GAP SERPL CALCULATED.3IONS-SCNC: 12 MMOL/L (ref 5–15)
BASOPHILS # BLD AUTO: 0.04 10*3/MM3 (ref 0–0.2)
BASOPHILS NFR BLD AUTO: 0.6 % (ref 0–1.5)
BUN SERPL-MCNC: 11 MG/DL (ref 6–20)
BUN/CREAT SERPL: 21.2 (ref 7–25)
CALCIUM SPEC-SCNC: 9.7 MG/DL (ref 8.6–10.5)
CHLORIDE SERPL-SCNC: 102 MMOL/L (ref 98–107)
CO2 SERPL-SCNC: 25 MMOL/L (ref 22–29)
CREAT SERPL-MCNC: 0.52 MG/DL (ref 0.57–1)
DEPRECATED RDW RBC AUTO: 38.4 FL (ref 37–54)
EGFRCR SERPLBLD CKD-EPI 2021: 108.5 ML/MIN/1.73
EOSINOPHIL # BLD AUTO: 0.17 10*3/MM3 (ref 0–0.4)
EOSINOPHIL NFR BLD AUTO: 2.5 % (ref 0.3–6.2)
ERYTHROCYTE [DISTWIDTH] IN BLOOD BY AUTOMATED COUNT: 13.2 % (ref 12.3–15.4)
FOLATE SERPL-MCNC: 10.8 NG/ML (ref 4.78–24.2)
GLUCOSE SERPL-MCNC: 89 MG/DL (ref 65–99)
HCT VFR BLD AUTO: 40.1 % (ref 34–46.6)
HGB BLD-MCNC: 13.6 G/DL (ref 12–15.9)
IMM GRANULOCYTES # BLD AUTO: 0.02 10*3/MM3 (ref 0–0.05)
IMM GRANULOCYTES NFR BLD AUTO: 0.3 % (ref 0–0.5)
IRON 24H UR-MRATE: 99 MCG/DL (ref 37–145)
LYMPHOCYTES # BLD AUTO: 1.76 10*3/MM3 (ref 0.7–3.1)
LYMPHOCYTES NFR BLD AUTO: 25.8 % (ref 19.6–45.3)
MCH RBC QN AUTO: 27.5 PG (ref 26.6–33)
MCHC RBC AUTO-ENTMCNC: 33.9 G/DL (ref 31.5–35.7)
MCV RBC AUTO: 81 FL (ref 79–97)
MONOCYTES # BLD AUTO: 0.67 10*3/MM3 (ref 0.1–0.9)
MONOCYTES NFR BLD AUTO: 9.8 % (ref 5–12)
NEUTROPHILS NFR BLD AUTO: 4.16 10*3/MM3 (ref 1.7–7)
NEUTROPHILS NFR BLD AUTO: 61 % (ref 42.7–76)
NRBC BLD AUTO-RTO: 0 /100 WBC (ref 0–0.2)
PLATELET # BLD AUTO: 291 10*3/MM3 (ref 140–450)
PMV BLD AUTO: 10.8 FL (ref 6–12)
POTASSIUM SERPL-SCNC: 4.2 MMOL/L (ref 3.5–5.2)
RBC # BLD AUTO: 4.95 10*6/MM3 (ref 3.77–5.28)
SODIUM SERPL-SCNC: 139 MMOL/L (ref 136–145)
VIT B12 BLD-MCNC: 491 PG/ML (ref 211–946)
WBC NRBC COR # BLD AUTO: 6.82 10*3/MM3 (ref 3.4–10.8)

## 2025-01-20 PROCEDURE — 83540 ASSAY OF IRON: CPT | Performed by: NURSE PRACTITIONER

## 2025-01-20 PROCEDURE — 80048 BASIC METABOLIC PNL TOTAL CA: CPT | Performed by: NURSE PRACTITIONER

## 2025-01-20 PROCEDURE — 99214 OFFICE O/P EST MOD 30 MIN: CPT | Performed by: NURSE PRACTITIONER

## 2025-01-20 PROCEDURE — 82607 VITAMIN B-12: CPT | Performed by: NURSE PRACTITIONER

## 2025-01-20 PROCEDURE — 85025 COMPLETE CBC W/AUTO DIFF WBC: CPT | Performed by: NURSE PRACTITIONER

## 2025-01-20 PROCEDURE — 82746 ASSAY OF FOLIC ACID SERUM: CPT | Performed by: NURSE PRACTITIONER

## 2025-01-20 RX ORDER — FLUTICASONE FUROATE AND VILANTEROL 200; 25 UG/1; UG/1
1 POWDER RESPIRATORY (INHALATION)
Qty: 1 EACH | Refills: 0 | Status: SHIPPED | OUTPATIENT
Start: 2025-01-20

## 2025-01-20 RX ORDER — BUDESONIDE AND FORMOTEROL FUMARATE DIHYDRATE 160; 4.5 UG/1; UG/1
2 AEROSOL RESPIRATORY (INHALATION)
Qty: 1 EACH | Refills: 5 | Status: CANCELLED | OUTPATIENT
Start: 2025-01-20 | End: 2025-02-19

## 2025-01-20 NOTE — PROGRESS NOTES
Chief Complaint  Shortness of Breath (The patient is coming in complaining of some shortness of breath for awhile. She states she has been having shallow breathing, sometimes she says she sometimes holds her breath subconsciously. She has been using her rescue inhaler recently for 2 times a day and she says this helps open her airway. She has seen a pulmonologist in the past due to a chronic cough. She was told she may have asthma. She is taking allegra everyday. )    Subjective      History of Present Illness  The patient is a 59-year-old female who presents for an acute visit complaining of shortness of air.    She reports an escalation in her dyspnea, necessitating the use of her rescue inhaler several times daily. She has been diagnosed with asthma by Dr. Lovett, a pulmonologist, but has not had a recent consultation with him. She does not experience wheezing but notes that her symptoms worsen when lying down at night and upon awakening, prompting the use of her inhaler. She describes her breathing as shallow and occasionally feels the need to hold her breath. She does not typically cough. She recalls a severe coughing episode in 11/2023, which lasted for 4 months and was alleviated by an inhaler prescribed by this provider, requiring 2 courses of treatment. Following this, she began a regimen of Allegra once daily and Flonase nasal spray at night, as recommended by Dr. Lovett. She has not been prescribed Singulair. She has attempted to use a CPAP machine but finds it uncomfortable, often feeling claustrophobic and struggling to breathe after approximately 40 minutes to 4 hours of use. She has tried a nasal CPAP machine but found it ineffective due to mouth breathing, which disrupts the suction and causes her to awaken. She is also supposed to wear a mouthguard for bruxism, but finds the combination of the mouthguard and CPAP machine intolerable. She expresses concern about potential oxygen deprivation to her  organs. She does not report any current anxiety.    She has a known history of anemia.    She is currently on Benicar, atenolol, and amlodipine for blood pressure management. She has not recently monitored her blood pressure at home. She admits to a preference for salty foods over sweet ones.    MEDICATIONS  Current: Allegra, Flonase, Benicar, atenolol, amlodipine       Past Medical History:   Diagnosis Date    Anemia     Anxiety and depression     Anxiety disorder, unspecified     Asthma Nov2022    Recurring cough and asthma developed    Bronchitis     Bulging lumbar disc     Chronic bronchitis 11/2022    Been coughing since this time    Crohn's disease     Crohn's disease of small intestine without complication     Crohn's disease of small intestine without complication     DJD (degenerative joint disease)     Essential (primary) hypertension     Eustachian tube dysfunction     Fibroma of tongue     GERD (gastroesophageal reflux disease)     Hepatic cyst     Hyperlipidemia, unspecified     Impaired fasting glucose     Insomnia     Internal hemorrhoids     Multiple thyroid nodules     Nephrolithiasis     Other cerebrovascular disease     Palpitations     Pneumonia 01/2023    Fluid in right lung until April despite meds    Protrusion of lumbar intervertebral disc     L5-S1    Sinusitis     Stroke     Vitamin D deficiency, unspecified     Zinc deficiency 04/2012        Past Surgical History:   Procedure Laterality Date    COLONOSCOPY  02/01/2017    ENDOSCOPY AND COLONOSCOPY  10/16/2007    HYSTERECTOMY  03/2019    PARTIAL    OTHER SURGICAL HISTORY      FNA OF THYROID    THYROID SURGERY      FNA OF THYROID        Social History     Tobacco Use   Smoking Status Never    Passive exposure: Never   Smokeless Tobacco Never        Patient Care Team:  Xenia Carpenter APRN as PCP - General (Nurse Practitioner)  Hayden Monet MD as Consulting Physician (Gastroenterology)  Davida Guzman MD as Consulting  "Physician (Endocrinology)  DAVID Lu MD as Consulting Physician (Cardiology)    Allergies   Allergen Reactions    Amoxicillin Hives and Unknown - Low Severity    Levofloxacin Nausea And Vomiting, Nausea Only and Unknown - Low Severity          Current Outpatient Medications:     albuterol sulfate  (90 Base) MCG/ACT inhaler, Inhale 2 puffs Every 4 (Four) Hours As Needed for Wheezing., Disp: 6.7 g, Rfl: 2    amLODIPine (NORVASC) 5 MG tablet, Take 1 tablet by mouth Daily., Disp: 90 tablet, Rfl: 1    aspirin 81 MG EC tablet, Take 1 tablet by mouth Daily., Disp: , Rfl:     atenolol (TENORMIN) 25 MG tablet, Take 1 tablet by mouth Daily., Disp: 90 tablet, Rfl: 1    atorvastatin (LIPITOR) 40 MG tablet, Take 1 tablet by mouth Daily., Disp: 90 tablet, Rfl: 1    buPROPion XL (WELLBUTRIN XL) 150 MG 24 hr tablet, Take 1 tablet by mouth Daily., Disp: 90 tablet, Rfl: 1    CALCIUM CARBONATE-VIT D-MIN PO, Take 1 tablet by mouth Daily., Disp: , Rfl:     fluticasone (FLONASE) 50 MCG/ACT nasal spray, SPRAY 2 SPRAYS INTO THE NOSTRIL DAILY AS DIRECTED BY PROVIDER, Disp: 16 g, Rfl: 1    multivitamin with minerals tablet tablet, Take 1 tablet by mouth Daily., Disp: , Rfl:     olmesartan-hydrochlorothiazide (BENICAR HCT) 40-25 MG per tablet, Take 1 tablet by mouth Daily., Disp: 90 tablet, Rfl: 1    vitamin D (ERGOCALCIFEROL) 1.25 MG (21669 UT) capsule capsule, Take 1 capsule by mouth 2 (Two) Times a Week., Disp: 26 capsule, Rfl: 1    Fluticasone Furoate-Vilanterol (BREO ELLIPTA) 200-25 MCG/ACT inhaler, Inhale 1 puff Daily., Disp: 1 each, Rfl: 0    Objective     Vitals:    01/20/25 1018   BP: 130/90   BP Location: Right arm   Patient Position: Sitting   Cuff Size: Large Adult   Pulse: 89   Temp: 97.5 °F (36.4 °C)   TempSrc: Temporal   SpO2: 98%   Weight: 115 kg (254 lb)   Height: 170.2 cm (67.01\")        Wt Readings from Last 3 Encounters:   01/20/25 115 kg (254 lb)   11/12/24 115 kg (253 lb)   06/21/24 116 kg (256 lb)        BP " Readings from Last 3 Encounters:   01/20/25 130/90   11/12/24 128/62   06/21/24 126/77          Physical Exam  Constitutional:       General: She is not in acute distress.  HENT:      Head: Normocephalic and atraumatic.   Cardiovascular:      Rate and Rhythm: Normal rate and regular rhythm.      Heart sounds: Normal heart sounds.   Pulmonary:      Breath sounds: Normal breath sounds. No wheezing, rhonchi or rales.   Lymphadenopathy:      Cervical: No cervical adenopathy.   Neurological:      General: No focal deficit present.      Mental Status: She is alert.                Result Review   The following data was reviewed by: JENNYFER Plascencia on 01/20/2025:  [x]  Tests & Results  []  Hospitalization/Emergency Department/Urgent Care  [x]  Internal/External Consultant Notes       Assessment and Plan     Diagnoses and all orders for this visit:    1. Shortness of breath (Primary)  -     Basic Metabolic Panel; Future    2. Moderate persistent asthma without complication    3. Essential hypertension    4. History of anemia  -     Vitamin B12; Future  -     Folate; Future  -     Iron; Future  -     CBC & Differential; Future    Other orders  -     Fluticasone Furoate-Vilanterol (BREO ELLIPTA) 200-25 MCG/ACT inhaler; Inhale 1 puff Daily.  Dispense: 1 each; Refill: 0         Assessment & Plan  1. Dyspnea.  She reports increased use of her rescue inhaler, needing it a couple of times a day. She does not experience wheezing but has shortness of breath, especially when lying down at night and shortly after waking up. She has a history of asthma, previously managed by Dr. Lovett, but has not seen him recently. She also has a history of anemia, which could contribute to her symptoms. A maintenance inhaler, Breo, will be prescribed for daily use, and she is advised to continue using her rescue inhaler as needed. She is instructed to rinse her mouth after using the steroid inhaler to prevent thrush. A prescription for Breo  will be sent to [facility]. If it is not covered by her insurance, an alternative medication will be considered. Basic laboratory tests, including iron count, CBC, and BMP, will be ordered to check for anemia.    2. Hypertension.  Her blood pressure was recorded at 130/90 mmHg during the visit. She is currently on Benicar, atenolol, and amlodipine. She is advised to monitor her blood pressure at home and report the readings, particularly the diastolic value.    Follow-up  The patient will follow up in 2 weeks.          Patient was given instructions and counseling regarding her condition or for health maintenance advice. Please see specific information pulled into the AVS if appropriate.     Follow Up   Return in about 2 weeks (around 2/3/2025) for Recheck.    Patient or patient representative verbalized consent for the use of Ambient Listening during the visit with  JENNYFER Plascencia for chart documentation. 1/20/2025  10:23 JENNYFER Soriano

## 2025-02-03 NOTE — PROGRESS NOTES
Chief Complaint  Primary Care Follow-Up (59 year old female here today for a 2 week follow up for shortness of breath. Pt states she is feeling much better today and it has improved. )    Subjective      History of Present Illness  The patient presents for evaluation of asthma, hypertension, and hyperlipidemia.    She has been experiencing dyspnea, which has improved with the use of Breo once daily. She reports episodes of shallow breathing at night, particularly when lying down, which are alleviated by a single puff of her rescue inhaler. This need for the rescue inhaler is not nightly, and she notes an improvement in her overall respiratory function. She has been diagnosed with asthma and was advised to use a CPAP machine. She has been dealing with a persistent cough for the past 4 months. She does not have any upcoming appointments with her pulmonologist. She has been using her albuterol rescue inhaler less frequently than before, primarily when lying down.    She has not been monitoring her blood pressure at home but plans to start doing so. She is currently on a regimen of three antihypertensive medications, which she takes consistently. She reports that this combination of medications has been effective in controlling her blood pressure, as monotherapy with Benicar was insufficient.    She discontinued her atorvastatin medication, which she had been taking since 2014 following a stroke, due to concerns about potential side effects. She was under the care of a neurologist, who advised her that further follow-up was unnecessary unless new symptoms arose. She underwent a stress test approximately 15 years ago.    Supplemental Information  She has been getting hot flashes.     Past Medical History:   Diagnosis Date    Anemia     Anxiety and depression     Anxiety disorder, unspecified     Asthma Nov2022    Recurring cough and asthma developed    Bronchitis     Bulging lumbar disc     Chronic bronchitis 11/2022     Been coughing since this time    Crohn's disease     Crohn's disease of small intestine without complication     Crohn's disease of small intestine without complication     DJD (degenerative joint disease)     Essential (primary) hypertension     Eustachian tube dysfunction     Fibroma of tongue     GERD (gastroesophageal reflux disease)     Hepatic cyst     Hyperlipidemia, unspecified     Impaired fasting glucose     Insomnia     Internal hemorrhoids     Multiple thyroid nodules     Nephrolithiasis     Other cerebrovascular disease     Palpitations     Pneumonia 01/2023    Fluid in right lung until April despite meds    Protrusion of lumbar intervertebral disc     L5-S1    Sinusitis     Stroke     Vitamin D deficiency, unspecified     Zinc deficiency 04/2012        Past Surgical History:   Procedure Laterality Date    COLONOSCOPY  02/01/2017    ENDOSCOPY AND COLONOSCOPY  10/16/2007    HYSTERECTOMY  03/2019    PARTIAL    OTHER SURGICAL HISTORY      FNA OF THYROID    THYROID SURGERY      FNA OF THYROID        Social History     Tobacco Use   Smoking Status Never    Passive exposure: Never   Smokeless Tobacco Never        Patient Care Team:  Xenia Carpenter APRN as PCP - General (Nurse Practitioner)  Hayden Monet MD as Consulting Physician (Gastroenterology)  Davida Guzman MD as Consulting Physician (Endocrinology)  DAVID Lu MD as Consulting Physician (Cardiology)    Allergies   Allergen Reactions    Amoxicillin Hives and Unknown - Low Severity    Levofloxacin Nausea And Vomiting, Nausea Only and Unknown - Low Severity          Current Outpatient Medications:     albuterol sulfate  (90 Base) MCG/ACT inhaler, Inhale 2 puffs Every 4 (Four) Hours As Needed for Wheezing., Disp: 6.7 g, Rfl: 5    amLODIPine (NORVASC) 5 MG tablet, Take 1 tablet by mouth Daily., Disp: 90 tablet, Rfl: 1    aspirin 81 MG EC tablet, Take 1 tablet by mouth Daily., Disp: , Rfl:     atenolol (TENORMIN) 25 MG tablet,  "Take 1 tablet by mouth Daily., Disp: 90 tablet, Rfl: 1    buPROPion XL (WELLBUTRIN XL) 150 MG 24 hr tablet, Take 1 tablet by mouth Daily., Disp: 90 tablet, Rfl: 1    CALCIUM CARBONATE-VIT D-MIN PO, Take 1 tablet by mouth Daily., Disp: , Rfl:     fluticasone (FLONASE) 50 MCG/ACT nasal spray, SPRAY 2 SPRAYS INTO THE NOSTRIL DAILY AS DIRECTED BY PROVIDER, Disp: 16 g, Rfl: 1    Fluticasone Furoate-Vilanterol (BREO ELLIPTA) 200-25 MCG/ACT inhaler, Inhale 1 puff Daily., Disp: 3 each, Rfl: 3    multivitamin with minerals tablet tablet, Take 1 tablet by mouth Daily., Disp: , Rfl:     olmesartan-hydrochlorothiazide (BENICAR HCT) 40-25 MG per tablet, Take 1 tablet by mouth Daily., Disp: 90 tablet, Rfl: 1    vitamin D (ERGOCALCIFEROL) 1.25 MG (64201 UT) capsule capsule, Take 1 capsule by mouth 2 (Two) Times a Week., Disp: 26 capsule, Rfl: 1    atorvastatin (LIPITOR) 40 MG tablet, Take 1 tablet by mouth Daily. (Patient not taking: Reported on 2/4/2025), Disp: 90 tablet, Rfl: 1    Objective     Vitals:    02/04/25 0819   BP: 132/64   BP Location: Left arm   Patient Position: Sitting   Cuff Size: Large Adult   Pulse: 84   Resp: 18   Temp: 98.4 °F (36.9 °C)   TempSrc: Temporal   SpO2: 97%   Weight: 116 kg (256 lb 6.4 oz)   Height: 170.2 cm (67.01\")        Wt Readings from Last 3 Encounters:   02/04/25 116 kg (256 lb 6.4 oz)   01/20/25 115 kg (254 lb)   11/12/24 115 kg (253 lb)        BP Readings from Last 3 Encounters:   02/04/25 132/64   01/20/25 130/90   11/12/24 128/62        Physical Exam  Vital Signs  Blood pressure is 132/64, pulse is 84, and oxygen saturation is 97%.    Physical Exam  Constitutional:       General: She is not in acute distress.  HENT:      Head: Normocephalic and atraumatic.   Neck:      Vascular: No carotid bruit.   Cardiovascular:      Rate and Rhythm: Normal rate and regular rhythm.      Heart sounds: Normal heart sounds.   Pulmonary:      Breath sounds: Normal breath sounds. No wheezing, rhonchi or " rales.   Musculoskeletal:      Right lower leg: No edema.      Left lower leg: No edema.   Neurological:      General: No focal deficit present.      Mental Status: She is alert.                Result Review   The following data was reviewed by: JENNYFER Plascencia on 02/04/2025:  [x]  Tests & Results  []  Hospitalization/Emergency Department/Urgent Care  []  Internal/External Consultant Notes       Assessment and Plan     Diagnoses and all orders for this visit:    1. Asthma, unspecified asthma severity, unspecified whether complicated, unspecified whether persistent (Primary)  -     albuterol sulfate  (90 Base) MCG/ACT inhaler; Inhale 2 puffs Every 4 (Four) Hours As Needed for Wheezing.  Dispense: 6.7 g; Refill: 5    2. Essential hypertension  -     Treadmill Stress test; Future    3. Hyperlipidemia, unspecified hyperlipidemia type  -     Treadmill Stress test; Future    4. Dyspnea, unspecified type  -     Treadmill Stress test; Future    5. History of CVA (cerebrovascular accident)  -     Treadmill Stress test; Future    Other orders  -     Fluticasone Furoate-Vilanterol (BREO ELLIPTA) 200-25 MCG/ACT inhaler; Inhale 1 puff Daily.  Dispense: 3 each; Refill: 3         Assessment & Plan  1. Asthma.  Her dyspnea has shown improvement with the use of Breo. The etiology of her shortness of breath is likely due to asthma. She will continue her current regimen of Breo once daily. A prescription for a 30-day supply of Breo with 3 refills will be sent to [facility]. Additionally, a refill for her albuterol rescue inhaler will be provided.    2. Hypertension.  Her blood pressure readings have improved compared to the previous visit, where it was recorded as 130/90. Today's reading is 132/64, which is within the acceptable range. She is advised to monitor her blood pressure at home. If her blood pressure consistently remains at or above 140/90, adjustments to her medication regimen may be necessary.    3.  Hyperlipidemia.  She has discontinued her atorvastatin medication, which was initially prescribed following a stroke in 2014. Her last  cholesterol levels were within normal limits. The importance of atorvastatin in preventing further strokes and heart attacks was discussed. The potential side effects of atorvastatin, including muscle aches and elevated liver enzymes, were also reviewed. She will resume her atorvastatin therapy. A stress test will be ordered for her. Fasting labs, including a cholesterol panel, have already been ordered for her next visit.    Follow-up  The patient is scheduled for a follow-up visit in May 2025.     Patient was given instructions and counseling regarding her condition or for health maintenance advice. Please see specific information pulled into the AVS if appropriate.     Follow Up   Return for Next scheduled follow up, or if symptoms worsen or fail to improve.    Patient or patient representative verbalized consent for the use of Ambient Listening during the visit with  JENNYFER Plascencia for chart documentation. 2/4/2025  08:22 JENNYFER Soriano

## 2025-02-04 ENCOUNTER — OFFICE VISIT (OUTPATIENT)
Dept: INTERNAL MEDICINE | Age: 60
End: 2025-02-04
Payer: COMMERCIAL

## 2025-02-04 VITALS
TEMPERATURE: 98.4 F | BODY MASS INDEX: 40.24 KG/M2 | DIASTOLIC BLOOD PRESSURE: 64 MMHG | SYSTOLIC BLOOD PRESSURE: 132 MMHG | HEIGHT: 67 IN | HEART RATE: 84 BPM | OXYGEN SATURATION: 97 % | WEIGHT: 256.4 LBS | RESPIRATION RATE: 18 BRPM

## 2025-02-04 DIAGNOSIS — J45.909 ASTHMA, UNSPECIFIED ASTHMA SEVERITY, UNSPECIFIED WHETHER COMPLICATED, UNSPECIFIED WHETHER PERSISTENT: Primary | ICD-10-CM

## 2025-02-04 DIAGNOSIS — Z86.73 HISTORY OF CVA (CEREBROVASCULAR ACCIDENT): ICD-10-CM

## 2025-02-04 DIAGNOSIS — E78.5 HYPERLIPIDEMIA, UNSPECIFIED HYPERLIPIDEMIA TYPE: ICD-10-CM

## 2025-02-04 DIAGNOSIS — R06.00 DYSPNEA, UNSPECIFIED TYPE: ICD-10-CM

## 2025-02-04 DIAGNOSIS — I10 ESSENTIAL HYPERTENSION: ICD-10-CM

## 2025-02-04 PROCEDURE — 99214 OFFICE O/P EST MOD 30 MIN: CPT | Performed by: NURSE PRACTITIONER

## 2025-02-04 RX ORDER — ALBUTEROL SULFATE 90 UG/1
2 INHALANT RESPIRATORY (INHALATION) EVERY 4 HOURS PRN
Qty: 6.7 G | Refills: 5 | Status: SHIPPED | OUTPATIENT
Start: 2025-02-04

## 2025-02-04 RX ORDER — FLUTICASONE FUROATE AND VILANTEROL 200; 25 UG/1; UG/1
1 POWDER RESPIRATORY (INHALATION)
Qty: 3 EACH | Refills: 3 | Status: SHIPPED | OUTPATIENT
Start: 2025-02-04

## 2025-03-03 RX ORDER — FLUTICASONE PROPIONATE 50 MCG
2 SPRAY, SUSPENSION (ML) NASAL DAILY
Qty: 16 G | Refills: 1 | Status: SHIPPED | OUTPATIENT
Start: 2025-03-03

## 2025-04-30 RX ORDER — FLUTICASONE PROPIONATE 50 MCG
2 SPRAY, SUSPENSION (ML) NASAL DAILY
Qty: 16 G | Refills: 1 | Status: SHIPPED | OUTPATIENT
Start: 2025-04-30

## 2025-05-08 DIAGNOSIS — Z23 NEED FOR INFLUENZA VACCINATION: ICD-10-CM

## 2025-05-08 RX ORDER — ERGOCALCIFEROL 1.25 MG/1
50000 CAPSULE, LIQUID FILLED ORAL 2 TIMES WEEKLY
Qty: 26 CAPSULE | Refills: 1 | Status: SHIPPED | OUTPATIENT
Start: 2025-05-08

## 2025-05-09 ENCOUNTER — TELEPHONE (OUTPATIENT)
Dept: INTERNAL MEDICINE | Age: 60
End: 2025-05-09
Payer: COMMERCIAL

## 2025-05-09 NOTE — TELEPHONE ENCOUNTER
Name: Carine Tobin    Relationship: Self    [facility]UB PROVIDED THE RELAY MESSAGE FROM THE OFFICE   PATIENT VOICED UNDERSTANDING AND HAS NO FURTHER QUESTIONS AT THIS TIME

## 2025-05-09 NOTE — TELEPHONE ENCOUNTER
CALLED AND LM FOR THE PATIENT. SHE NEEDS TO HAVE FASTING LABS DONE BEFORE HER APPOINTMENT ON 05/12.     HUB MAY RELAY

## 2025-05-16 ENCOUNTER — LAB (OUTPATIENT)
Facility: HOSPITAL | Age: 60
End: 2025-05-16
Payer: COMMERCIAL

## 2025-05-16 ENCOUNTER — TRANSCRIBE ORDERS (OUTPATIENT)
Dept: ADMINISTRATIVE | Facility: HOSPITAL | Age: 60
End: 2025-05-16
Payer: COMMERCIAL

## 2025-05-16 DIAGNOSIS — R73.01 IMPAIRED FASTING GLUCOSE: ICD-10-CM

## 2025-05-16 DIAGNOSIS — E55.9 VITAMIN D DEFICIENCY: ICD-10-CM

## 2025-05-16 DIAGNOSIS — R73.03 PREDIABETES: ICD-10-CM

## 2025-05-16 DIAGNOSIS — E04.2 MULTINODULAR GOITER: Primary | ICD-10-CM

## 2025-05-16 DIAGNOSIS — Z00.00 ANNUAL PHYSICAL EXAM: ICD-10-CM

## 2025-05-16 DIAGNOSIS — E04.2 MULTINODULAR GOITER: ICD-10-CM

## 2025-05-16 LAB
25(OH)D3 SERPL-MCNC: 58.6 NG/ML (ref 30–100)
ALBUMIN SERPL-MCNC: 4.1 G/DL (ref 3.5–5.2)
ALBUMIN/GLOB SERPL: 1.5 G/DL
ALP SERPL-CCNC: 85 U/L (ref 39–117)
ALT SERPL W P-5'-P-CCNC: 26 U/L (ref 1–33)
ANION GAP SERPL CALCULATED.3IONS-SCNC: 11.8 MMOL/L (ref 5–15)
AST SERPL-CCNC: 24 U/L (ref 1–32)
BASOPHILS # BLD AUTO: 0.04 10*3/MM3 (ref 0–0.2)
BASOPHILS NFR BLD AUTO: 0.5 % (ref 0–1.5)
BILIRUB SERPL-MCNC: 0.8 MG/DL (ref 0–1.2)
BUN SERPL-MCNC: 13 MG/DL (ref 6–20)
BUN/CREAT SERPL: 16 (ref 7–25)
CALCIUM SPEC-SCNC: 9.3 MG/DL (ref 8.6–10.5)
CHLORIDE SERPL-SCNC: 105 MMOL/L (ref 98–107)
CHOLEST SERPL-MCNC: 129 MG/DL (ref 0–200)
CO2 SERPL-SCNC: 22.2 MMOL/L (ref 22–29)
CREAT SERPL-MCNC: 0.81 MG/DL (ref 0.57–1)
DEPRECATED RDW RBC AUTO: 38.9 FL (ref 37–54)
EGFRCR SERPLBLD CKD-EPI 2021: 84.8 ML/MIN/1.73
EOSINOPHIL # BLD AUTO: 0 10*3/MM3 (ref 0–0.4)
EOSINOPHIL NFR BLD AUTO: 0 % (ref 0.3–6.2)
ERYTHROCYTE [DISTWIDTH] IN BLOOD BY AUTOMATED COUNT: 13.1 % (ref 12.3–15.4)
GLOBULIN UR ELPH-MCNC: 2.8 GM/DL
GLUCOSE SERPL-MCNC: 95 MG/DL (ref 65–99)
HBA1C MFR BLD: 5.5 % (ref 4.8–5.6)
HCT VFR BLD AUTO: 39.6 % (ref 34–46.6)
HDLC SERPL-MCNC: 58 MG/DL (ref 40–60)
HGB BLD-MCNC: 13.5 G/DL (ref 12–15.9)
IMM GRANULOCYTES # BLD AUTO: 0.02 10*3/MM3 (ref 0–0.05)
IMM GRANULOCYTES NFR BLD AUTO: 0.3 % (ref 0–0.5)
LDLC SERPL CALC-MCNC: 56 MG/DL (ref 0–100)
LDLC/HDLC SERPL: 0.97 {RATIO}
LYMPHOCYTES # BLD AUTO: 1.68 10*3/MM3 (ref 0.7–3.1)
LYMPHOCYTES NFR BLD AUTO: 21.9 % (ref 19.6–45.3)
MCH RBC QN AUTO: 27.8 PG (ref 26.6–33)
MCHC RBC AUTO-ENTMCNC: 34.1 G/DL (ref 31.5–35.7)
MCV RBC AUTO: 81.6 FL (ref 79–97)
MONOCYTES # BLD AUTO: 0.74 10*3/MM3 (ref 0.1–0.9)
MONOCYTES NFR BLD AUTO: 9.6 % (ref 5–12)
NEUTROPHILS NFR BLD AUTO: 5.19 10*3/MM3 (ref 1.7–7)
NEUTROPHILS NFR BLD AUTO: 67.7 % (ref 42.7–76)
NRBC BLD AUTO-RTO: 0 /100 WBC (ref 0–0.2)
PLATELET # BLD AUTO: 264 10*3/MM3 (ref 140–450)
PMV BLD AUTO: 10.5 FL (ref 6–12)
POTASSIUM SERPL-SCNC: 4.4 MMOL/L (ref 3.5–5.2)
PROT SERPL-MCNC: 6.9 G/DL (ref 6–8.5)
RBC # BLD AUTO: 4.85 10*6/MM3 (ref 3.77–5.28)
SODIUM SERPL-SCNC: 139 MMOL/L (ref 136–145)
T4 FREE SERPL-MCNC: 1.18 NG/DL (ref 0.92–1.68)
TRIGL SERPL-MCNC: 74 MG/DL (ref 0–150)
TSH SERPL DL<=0.05 MIU/L-ACNC: 0.19 UIU/ML (ref 0.27–4.2)
VLDLC SERPL-MCNC: 15 MG/DL (ref 5–40)
WBC NRBC COR # BLD AUTO: 7.67 10*3/MM3 (ref 3.4–10.8)

## 2025-05-16 PROCEDURE — 36415 COLL VENOUS BLD VENIPUNCTURE: CPT

## 2025-05-16 PROCEDURE — 83036 HEMOGLOBIN GLYCOSYLATED A1C: CPT

## 2025-05-16 PROCEDURE — 80050 GENERAL HEALTH PANEL: CPT

## 2025-05-16 PROCEDURE — 80061 LIPID PANEL: CPT

## 2025-05-16 PROCEDURE — 84439 ASSAY OF FREE THYROXINE: CPT

## 2025-05-16 PROCEDURE — 82306 VITAMIN D 25 HYDROXY: CPT

## 2025-05-27 RX ORDER — FLUTICASONE PROPIONATE 50 MCG
2 SPRAY, SUSPENSION (ML) NASAL DAILY
Qty: 16 G | Refills: 1 | Status: SHIPPED | OUTPATIENT
Start: 2025-05-27

## 2025-06-11 ENCOUNTER — OFFICE VISIT (OUTPATIENT)
Dept: CARDIOLOGY | Facility: CLINIC | Age: 60
End: 2025-06-11
Payer: COMMERCIAL

## 2025-06-11 VITALS
HEART RATE: 66 BPM | SYSTOLIC BLOOD PRESSURE: 161 MMHG | DIASTOLIC BLOOD PRESSURE: 86 MMHG | HEIGHT: 67 IN | BODY MASS INDEX: 40.18 KG/M2 | WEIGHT: 256 LBS

## 2025-06-11 DIAGNOSIS — E78.2 HYPERLIPEMIA, MIXED: ICD-10-CM

## 2025-06-11 DIAGNOSIS — E66.01 MORBID (SEVERE) OBESITY DUE TO EXCESS CALORIES: ICD-10-CM

## 2025-06-11 DIAGNOSIS — R06.09 DYSPNEA ON EXERTION: Primary | ICD-10-CM

## 2025-06-11 DIAGNOSIS — I10 HYPERTENSION, ESSENTIAL: ICD-10-CM

## 2025-06-11 NOTE — PROGRESS NOTES
Chief Complaint  1 yr follow up , Hypertension, Hyperlipidemia, and deng    Subjective            Carine Tobin presents to [facility] CARDIOLOGY      The patient is here for follow-up evaluation management of essential hypertension, obesity, mixed hyperlipidemia.  She reports shortness of breath with exertion.  She had bronchitis/cough over about a 5-month period and has seen pulmonology and was diagnosed with mild asthma.  She has been on inhaled medications since then.  The symptoms seem to be worse.  She denies chest pain.  Weight has been stable.  Her blood pressure readings have been variable in primary care visits.  She is a non-smoker.    PMH  Past Medical History:   Diagnosis Date    Anemia     Anxiety and depression     Anxiety disorder, unspecified     Asthma Nov2022    Recurring cough and asthma developed    Bronchitis     Bulging lumbar disc     Chronic bronchitis 11/2022    Been coughing since this time    Crohn's disease     Crohn's disease of small intestine without complication     Crohn's disease of small intestine without complication     DJD (degenerative joint disease)     Essential (primary) hypertension     Eustachian tube dysfunction     Fibroma of tongue     GERD (gastroesophageal reflux disease)     Hepatic cyst     Hyperlipidemia, unspecified     Impaired fasting glucose     Insomnia     Internal hemorrhoids     Multiple thyroid nodules     Nephrolithiasis     Other cerebrovascular disease     Palpitations     Pneumonia 01/2023    Fluid in right lung until April despite meds    Protrusion of lumbar intervertebral disc     L5-S1    Sinusitis     Stroke     Vitamin D deficiency, unspecified     Zinc deficiency 04/2012         SURGICALHX  Past Surgical History:   Procedure Laterality Date    COLONOSCOPY  02/01/2017    ENDOSCOPY AND COLONOSCOPY  10/16/2007    HYSTERECTOMY  03/2019    PARTIAL    OTHER SURGICAL HISTORY      FNA OF THYROID    THYROID SURGERY      FNA OF THYROID         SOC  Social History     Socioeconomic History    Marital status:    Tobacco Use    Smoking status: Never     Passive exposure: Never    Smokeless tobacco: Never   Vaping Use    Vaping status: Never Used   Substance and Sexual Activity    Alcohol use: Yes     Alcohol/week: 1.0 standard drink of alcohol     Types: 1 Glasses of wine per week     Comment: rarely    Drug use: Never    Sexual activity: Yes     Partners: Male     Birth control/protection: Vasectomy, Hysterectomy, Surgical         FAMHX  Family History   Problem Relation Age of Onset    Cancer Mother         Kidney removed    Hypertension Mother         Pacemaker; high triglycerides    Heart disease Mother     Diabetes Father         Type 2 -diagnosed at older age    Heart failure Father         Multiple Organ failure - kidney and liver    Heart disease Father     Heart disease Maternal Grandmother           ALLERGY  Allergies   Allergen Reactions    Amoxicillin Hives and Unknown - Low Severity    Levofloxacin Nausea And Vomiting, Nausea Only and Unknown - Low Severity        MEDSCURRENT    Current Outpatient Medications:     albuterol sulfate  (90 Base) MCG/ACT inhaler, Inhale 2 puffs Every 4 (Four) Hours As Needed for Wheezing., Disp: 6.7 g, Rfl: 5    amLODIPine (NORVASC) 5 MG tablet, Take 1 tablet by mouth Daily., Disp: 90 tablet, Rfl: 1    aspirin 81 MG EC tablet, Take 1 tablet by mouth Daily., Disp: , Rfl:     atenolol (TENORMIN) 25 MG tablet, Take 1 tablet by mouth Daily., Disp: 90 tablet, Rfl: 1    atorvastatin (LIPITOR) 40 MG tablet, Take 1 tablet by mouth Daily., Disp: 90 tablet, Rfl: 1    buPROPion XL (WELLBUTRIN XL) 150 MG 24 hr tablet, Take 1 tablet by mouth Daily., Disp: 90 tablet, Rfl: 1    CALCIUM CARBONATE-VIT D-MIN PO, Take 1 tablet by mouth Daily., Disp: , Rfl:     fluticasone (FLONASE) 50 MCG/ACT nasal spray, Administer 2 sprays into the nostril(s) as directed by provider Daily. As directed by the provider, Disp: 16  "g, Rfl: 1    Fluticasone Furoate-Vilanterol (BREO ELLIPTA) 200-25 MCG/ACT inhaler, Inhale 1 puff Daily., Disp: 3 each, Rfl: 3    multivitamin with minerals tablet tablet, Take 1 tablet by mouth Daily., Disp: , Rfl:     olmesartan-hydrochlorothiazide (BENICAR HCT) 40-25 MG per tablet, Take 1 tablet by mouth Daily., Disp: 90 tablet, Rfl: 1    vitamin D (ERGOCALCIFEROL) 1.25 MG (17426 UT) capsule capsule, TAKE 1 CAPSULE BY MOUTH 2 (TWO) TIMES A WEEK., Disp: 26 capsule, Rfl: 1      Review of Systems   Cardiovascular:  Positive for dyspnea on exertion. Negative for chest pain.   Respiratory:  Positive for shortness of breath.         Objective     /86   Pulse 66   Ht 170.2 cm (67.01\")   Wt 116 kg (256 lb)   BMI 40.08 kg/m²       General Appearance:   well developed  well nourished  HENT:   oropharynx moist  lips not cyanotic  Neck:  thyroid not enlarged  supple  Respiratory:  no respiratory distress  normal breath sounds  no rales  Cardiovascular:  no jugular venous distention  regular rhythm  apical impulse normal  S1 normal, S2 normal  no S3, no S4   no murmur  no rub, no thrill  carotid pulses normal; no bruit  pedal pulses normal  lower extremity edema: none    Musculoskeletal:  no clubbing of fingers.   normocephalic, head atraumatic  Skin:   warm, dry  Psychiatric:  judgement and insight appropriate  normal mood and affect      Result Review :     The following data was reviewed by: Nasim Lu MD on 06/21/2024:    CMP          10/12/2024    11:48 1/20/2025    11:24 5/16/2025    09:56   CMP   Glucose 100  89  95    BUN 10  11  13    Creatinine 0.68  0.52  0.81    EGFR 102.4  108.5  84.8    Sodium 137  139  139    Potassium 4.1  4.2  4.4    Chloride 104  102  105    Calcium 9.2  9.7  9.3    Total Protein 6.5   6.9    Albumin 4.0   4.1    Globulin 2.5   2.8    Total Bilirubin 0.4   0.8    Alkaline Phosphatase 70   85    AST (SGOT) 23   24    ALT (SGPT) 24   26    Albumin/Globulin Ratio 1.6   " 1.5    BUN/Creatinine Ratio 14.7  21.2  16.0    Anion Gap 9.6  12.0  11.8      CBC          10/12/2024    11:48 1/20/2025    11:24 5/16/2025    09:56   CBC   WBC 7.48  6.82  7.67    RBC 4.87  4.95  4.85    Hemoglobin 13.1  13.6  13.5    Hematocrit 40.5  40.1  39.6    MCV 83.2  81.0  81.6    MCH 26.9  27.5  27.8    MCHC 32.3  33.9  34.1    RDW 14.0  13.2  13.1    Platelets 259  291  264      Lipid Panel          10/12/2024    11:48 5/16/2025    09:56   Lipid Panel   Total Cholesterol 160  129    Triglycerides 70  74    HDL Cholesterol 56  58    VLDL Cholesterol 14  15    LDL Cholesterol  90  56    LDL/HDL Ratio 1.61  0.97      TSH          10/12/2024    11:48 5/16/2025    09:56   TSH   TSH 0.372  0.191             Procedures                Assessment and Plan        ASSESSMENT:  Encounter Diagnoses   Name Primary?    Dyspnea on exertion Yes    Hypertension, essential     Hyperlipemia, mixed     Morbid (severe) obesity due to excess calories          PLAN:    1.  Dyspnea on exertion-subjectively somewhat worse recently.  Based on the history it sounds like this is probably pulmonary although she has not had stress testing previously so we will get that scheduled as well as a repeat echo.  Her blood pressure is elevated today but primary care readings have been more normal.  The dyspnea could be due to multiple factors including obesity, deconditioning, pulmonary, blood pressure related, or cardiac.  Will refer to pulmonology locally here for reassessment.  2.  Essential hypertension-elevated today but has been normal on serial primary care visits.  She will monitor blood pressures at home and call if averaging greater than 140/90  3.  Mixed hyperlipidemia-stable on statin therapy  4.  Morbid obesity due to excess calories-weight loss counseling    We will discuss the diagnostic results when available and plan for follow-up thereafter.        Patient was given instructions and counseling regarding her condition or for  health maintenance advice. Please see specific information pulled into the AVS if appropriate.             DAVID Lu MD  6/11/2025    09:03 EDT

## 2025-06-23 NOTE — PROGRESS NOTES
Primary Care Provider  Xenia Carpenter APRN   Referring Provider  DAVID Lu MD    Patient Complaint  Follow-up, Shortness of Breath (On exertion and laying down at night. ), and Cough (When she starts coughing she goes into a coughing fit.)    Patient or patient representative verbalized consent for the use of Ambient Listening during the visit with  JENNYFER Skinner for chart documentation. 6/24/2025  15:26 EDT      Subjective       History of Presenting Illness  Carine Tobin is a pleasant 59 y.o. female  of  Dr. Lovett who presents to [facility] with history of shortness of breath, chronic cough, history of COVID infection, here for followup appointment.    History of Present Illness  The patient is a 59-year-old female who presents for a follow-up appointment.  Cough and shortness of air have been present since 2023.  It has been intermittent.  Patient states that her cough did improve after about 6 months.  She has been on Trelegy and after being on it for about 3 rounds of the inhaler she says it improved her cough.  Patient states she was told she had asthma by Dr. Lovett.  Patient states that she has had TIA 2014.  She still has a little bit of tingling as sometimes in her fingers on the left hand and around her mouth at times.    Patient says her PCP has put her on Breo and that seems to make a difference.  She is a lifelong non-smoker.  She was diagnosed with obstructive sleep apnea and snoring and was started on a CPAP but has not been using it.  She was having to use a mouthguard because she was grinding her teeth with that and the coughing that she was having she has stopped using it.  Because she had too much going on at the time to stick with it.  She has 2 types of mask at home 1 including nasal pillow and another 1 with a chinstrap.  She also feels that she does not take deep enough breaths, finds that she is taking short breaths at  times.     Patient reports she has had COVID twice in the past.  She has had no recent diagnostics.    She is scheduled to have cardiac diagnostics including stress test echocardiogram and treadmill test to rule out any issues with her heart for her shortness of air.  She has recently had blood work done    At present time patient denies wheezing, headaches, chest pain, weight loss or hemoptysis. Patient denies fevers, chills and night sweats. Carine Tobin is able to perform ADLs.      I have personally reviewed the review of systems, past family, social, medical and surgical histories; and agree with their findings.      Review of Systems   Constitutional: Negative.    HENT: Negative.     Respiratory:  Positive for cough and shortness of breath.    Cardiovascular: Negative.    Musculoskeletal: Negative.    Neurological: Negative.    Psychiatric/Behavioral: Negative.           Family History   Problem Relation Age of Onset    Cancer Mother         Kidney removed    Hypertension Mother         Pacemaker; high triglycerides    Heart disease Mother     Arrhythmia Mother     Diabetes Father         Type 2 -diagnosed at older age    Heart failure Father         Multiple Organ failure - kidney and liver    Heart disease Father     Arrhythmia Father     Heart attack Father     Heart disease Maternal Grandmother     Heart attack Maternal Grandmother         Social History     Socioeconomic History    Marital status:    Tobacco Use    Smoking status: Never     Passive exposure: Never    Smokeless tobacco: Never   Vaping Use    Vaping status: Never Used   Substance and Sexual Activity    Alcohol use: Yes     Alcohol/week: 1.0 standard drink of alcohol     Types: 1 Glasses of wine per week     Comment: rarely    Drug use: Never    Sexual activity: Yes     Partners: Male     Birth control/protection: Vasectomy, Hysterectomy, Surgical        Past Medical History:   Diagnosis Date    Anemia     Anxiety and depression      Anxiety disorder, unspecified     Asthma Nov2022    Recurring cough and asthma developed    Bronchitis     Bulging lumbar disc     Chronic bronchitis 11/2022    Been coughing since this time    Crohn's disease     Crohn's disease of small intestine without complication     Crohn's disease of small intestine without complication     DJD (degenerative joint disease)     Essential (primary) hypertension     Eustachian tube dysfunction     Fibroma of tongue     GERD (gastroesophageal reflux disease) 2012    Diagnosed with chrons. Acid reducer since.    Hepatic cyst     Hyperlipidemia, unspecified     Impaired fasting glucose     Insomnia     Internal hemorrhoids     Multiple thyroid nodules     Nephrolithiasis     Other cerebrovascular disease     Palpitations     Pneumonia 01/2023    Fluid in right lung until April despite meds    Protrusion of lumbar intervertebral disc     L5-S1    Sinusitis     Stroke 5/2014    Due to high blood pressure    Vitamin D deficiency, unspecified     Zinc deficiency 04/2012        Immunization History   Administered Date(s) Administered    Flu Vaccine Intradermal Quad 18-64YR 09/16/2013    Flu Vaccine Quad PF >36MO 10/24/2020, 11/19/2021    Fluzone  >6mos 11/12/2024    Fluzone (or Fluarix & Flulaval for VFC) >6mos 10/24/2020, 11/19/2021, 11/12/2022    Influenza, Unspecified 09/15/2014, 09/15/2014, 10/22/2020, 11/12/2022    Shingrix 06/28/2022, 11/12/2024    Tdap 07/19/2011, 07/19/2011       Allergies   Allergen Reactions    Amoxicillin Hives and Unknown - Low Severity    Levofloxacin Nausea And Vomiting, Nausea Only and Unknown - Low Severity          Current Outpatient Medications:     albuterol sulfate  (90 Base) MCG/ACT inhaler, Inhale 2 puffs Every 4 (Four) Hours As Needed for Wheezing., Disp: 6.7 g, Rfl: 5    amLODIPine (NORVASC) 5 MG tablet, Take 1 tablet by mouth Daily., Disp: 90 tablet, Rfl: 1    aspirin 81 MG EC tablet, Take 1 tablet by mouth Daily., Disp: , Rfl:      "atenolol (TENORMIN) 25 MG tablet, Take 1 tablet by mouth Daily., Disp: 90 tablet, Rfl: 1    atorvastatin (LIPITOR) 40 MG tablet, Take 1 tablet by mouth Daily., Disp: 90 tablet, Rfl: 1    buPROPion XL (WELLBUTRIN XL) 150 MG 24 hr tablet, Take 1 tablet by mouth Daily., Disp: 90 tablet, Rfl: 1    CALCIUM CARBONATE-VIT D-MIN PO, Take 1 tablet by mouth Daily., Disp: , Rfl:     fluticasone (FLONASE) 50 MCG/ACT nasal spray, Administer 2 sprays into the nostril(s) as directed by provider Daily. As directed by the provider, Disp: 16 g, Rfl: 1    Fluticasone Furoate-Vilanterol (BREO ELLIPTA) 200-25 MCG/ACT inhaler, Inhale 1 puff Daily., Disp: 3 each, Rfl: 3    multivitamin with minerals tablet tablet, Take 1 tablet by mouth Daily., Disp: , Rfl:     olmesartan-hydrochlorothiazide (BENICAR HCT) 40-25 MG per tablet, Take 1 tablet by mouth Daily., Disp: 90 tablet, Rfl: 1    vitamin D (ERGOCALCIFEROL) 1.25 MG (56129 UT) capsule capsule, TAKE 1 CAPSULE BY MOUTH 2 (TWO) TIMES A WEEK., Disp: 26 capsule, Rfl: 1         Vital Signs   /73 (BP Location: Left arm, Patient Position: Sitting, Cuff Size: Adult)   Pulse 84   Temp 98.6 °F (37 °C)   Resp 16   Ht 170.2 cm (67.01\")   Wt 116 kg (256 lb)   SpO2 97%   BMI 40.08 kg/m²       Objective     Physical Exam  Vitals reviewed.   Constitutional:       General: She is not in acute distress.     Appearance: Normal appearance. She is obese. She is not ill-appearing.   HENT:      Head: Normocephalic and atraumatic.      Nose: Nose normal.      Mouth/Throat:      Mouth: Mucous membranes are moist.      Pharynx: Oropharynx is clear.   Eyes:      Extraocular Movements: Extraocular movements intact.      Conjunctiva/sclera: Conjunctivae normal.      Pupils: Pupils are equal, round, and reactive to light.   Cardiovascular:      Rate and Rhythm: Normal rate and regular rhythm.      Pulses: Normal pulses.      Heart sounds: Normal heart sounds.   Pulmonary:      Effort: Pulmonary effort is " normal. No respiratory distress.      Breath sounds: Normal breath sounds. No stridor. No wheezing, rhonchi or rales.   Abdominal:      General: Bowel sounds are normal.   Musculoskeletal:         General: Normal range of motion.      Cervical back: Normal range of motion and neck supple.   Skin:     General: Skin is warm and dry.   Neurological:      Mental Status: She is alert and oriented to person, place, and time.   Psychiatric:         Behavior: Behavior normal.         Physical Exam  Heart: Heart rhythm is regular.  Lungs: Lungs are clear.         Results Review  I have personally reviewed the prior office notes, hospital records, labs, and diagnostics.    Results  Labs   - CBC: Normal   - CMP: Normal   - Eosinophil count: Normal    Imaging   - Lung scan: 05/2023, Unremarkable    Diagnostic Testing   - Pulmonary function test: 2023, Normal   - Sleep study: 2023, 357 episodes of snoring, translating to obstructive airway 73% of the time          Assessment         Patient Active Problem List   Diagnosis    Anemia    Anxiety disorder    Atypical chest pain    Crohn's disease    Drug-induced obesity    Essential hypertension    Gastroesophageal reflux disease    Hyperlipidemia    Impaired fasting glucose    Ischemic stroke    Mineral deficiency, not elsewhere classified    Multiple joint pain    Nontoxic single thyroid nodule    Palpitations    Regional ileocolitis    Vitamin D deficiency    Varicose veins of both lower extremities    Crohn's disease of small intestine    Cerebrovascular disease    Prediabetes        Plan     Diagnoses and all orders for this visit:    1. Dyspnea on exertion (Primary)  -     Complete PFT - Pre & Post Bronchodilator; Future  -     CT Chest Without Contrast; Future  -     6 Minute Walk Test; Future  -     Regional Allergen Zone 8 / With IgE; Future  -     Alpha - 1 - Antitrypsin Phenotype; Future    2. Personal history of COVID-19  -     Complete PFT - Pre & Post Bronchodilator;  Future  -     CT Chest Without Contrast; Future  -     6 Minute Walk Test; Future  -     Regional Allergen Zone 8 / With IgE; Future  -     Alpha - 1 - Antitrypsin Phenotype; Future    3. Obstructive sleep apnea  -     Complete PFT - Pre & Post Bronchodilator; Future  -     CT Chest Without Contrast; Future  -     6 Minute Walk Test; Future  -     Regional Allergen Zone 8 / With IgE; Future  -     Alpha - 1 - Antitrypsin Phenotype; Future    4. Chronic cough  -     Complete PFT - Pre & Post Bronchodilator; Future  -     CT Chest Without Contrast; Future  -     6 Minute Walk Test; Future  -     Regional Allergen Zone 8 / With IgE; Future  -     Alpha - 1 - Antitrypsin Phenotype; Future         Assessment & Plan  1. Shortness of breath.  Her pulmonary function test conducted in 2023 yielded normal results. A sleep study from the same year revealed 357 episodes of snoring, indicative of obstructive airway 73 percent of the time. Her last echocardiogram in 2023 was within normal limits. A CT scan without contrast will be ordered to identify any potential nodules, changes, or scarring in the lungs. A repeat pulmonary function test will be conducted for comparison with the 2023 results. She is advised to resume using the CPAP machine with a different mask. A 6-minute walk test will be performed concurrently with the pulmonary function test to assess oxygen levels during activity. Blood work will be ordered today, including regional allergen zone and alpha-1 antitrypsin level tests. If the pulmonary function test is negative, a methacholine challenge may be considered.    2. Asthma.  She reports a history of being treated with steroids, rescue inhalers, and Trelegy for persistent cough and shortness of breath, which improved with Trelegy. She is currently taking Breo, which has helped significantly. No changes to her current medications are recommended at this time.    3. Obstructive sleep apnea.  She has a history of  obstructive sleep apnea with 357 episodes of snoring noted in a 2023 sleep study. She is advised to restart using the CPAP machine with a different mask. If she experiences issues with the mask, a repeat sleep study may be necessary.    Follow-up  The patient will follow up in 1 month.      Smoking status:  reports that she has never smoked. She has never been exposed to tobacco smoke. She has never used smokeless tobacco.    Vaccination status: Reviewed  Immunization History   Administered Date(s) Administered    Flu Vaccine Intradermal Quad 18-64YR 09/16/2013    Flu Vaccine Quad PF >36MO 10/24/2020, 11/19/2021    Fluzone  >6mos 11/12/2024    Fluzone (or Fluarix & Flulaval for VFC) >6mos 10/24/2020, 11/19/2021, 11/12/2022    Influenza, Unspecified 09/15/2014, 09/15/2014, 10/22/2020, 11/12/2022    Shingrix 06/28/2022, 11/12/2024    Tdap 07/19/2011, 07/19/2011        Medications personally reviewed    Follow Up  Return in about 4 weeks (around 7/22/2025).    Patient was given instructions and counseling regarding her condition or for health maintenance advice. Please see specific information pulled into the AVS if appropriate.     I spent 36 minutes caring for Carine Tobin on this date of service. This time includes time spent by me in the following activities:preparing for the visit, reviewing tests, obtaining and/or reviewing a separately obtained history, performing a medically appropriate examination and/or evaluation, counseling and educating the patient/family/caregiver, ordering medications, tests, or procedures, documenting information in the medical record, independently interpreting results and communicating that information with the patient/family/caregiver and answered questions family members, discuss medications.

## 2025-06-24 ENCOUNTER — OFFICE VISIT (OUTPATIENT)
Dept: PULMONOLOGY | Facility: CLINIC | Age: 60
End: 2025-06-24
Payer: COMMERCIAL

## 2025-06-24 ENCOUNTER — LAB (OUTPATIENT)
Facility: HOSPITAL | Age: 60
End: 2025-06-24
Payer: COMMERCIAL

## 2025-06-24 VITALS
HEIGHT: 67 IN | RESPIRATION RATE: 16 BRPM | OXYGEN SATURATION: 97 % | HEART RATE: 84 BPM | BODY MASS INDEX: 40.18 KG/M2 | DIASTOLIC BLOOD PRESSURE: 73 MMHG | WEIGHT: 256 LBS | TEMPERATURE: 98.6 F | SYSTOLIC BLOOD PRESSURE: 138 MMHG

## 2025-06-24 DIAGNOSIS — G47.33 OBSTRUCTIVE SLEEP APNEA: ICD-10-CM

## 2025-06-24 DIAGNOSIS — R05.3 CHRONIC COUGH: ICD-10-CM

## 2025-06-24 DIAGNOSIS — R06.09 DYSPNEA ON EXERTION: ICD-10-CM

## 2025-06-24 DIAGNOSIS — Z86.16 PERSONAL HISTORY OF COVID-19: ICD-10-CM

## 2025-06-24 DIAGNOSIS — R06.09 DYSPNEA ON EXERTION: Primary | ICD-10-CM

## 2025-06-24 PROCEDURE — 86003 ALLG SPEC IGE CRUDE XTRC EA: CPT

## 2025-06-24 PROCEDURE — 82103 ALPHA-1-ANTITRYPSIN TOTAL: CPT

## 2025-06-24 PROCEDURE — 99214 OFFICE O/P EST MOD 30 MIN: CPT | Performed by: NURSE PRACTITIONER

## 2025-06-24 PROCEDURE — 82104 ALPHA-1-ANTITRYPSIN PHENO: CPT

## 2025-06-24 PROCEDURE — 82785 ASSAY OF IGE: CPT

## 2025-06-24 PROCEDURE — 36415 COLL VENOUS BLD VENIPUNCTURE: CPT

## 2025-06-29 LAB
A1AT PHENOTYP SERPL IFE: ABNORMAL
A1AT SERPL-MCNC: 188 MG/DL (ref 101–187)

## 2025-07-02 LAB
A ALTERNATA IGE QN: <0.1 KU/L
A FUMIGATUS IGE QN: <0.1 KU/L
AMER ROACH IGE QN: <0.1 KU/L
BAHIA GRASS IGE QN: <0.1 KU/L
BERMUDA GRASS IGE QN: <0.1 KU/L
BOXELDER IGE QN: <0.1 KU/L
C HERBARUM IGE QN: <0.1 KU/L
CAT DANDER IGE QN: <0.1 KU/L
CMN PIGWEED IGE QN: <0.1 KU/L
COMMON RAGWEED IGE QN: <0.1 KU/L
D FARINAE IGE QN: <0.1 KU/L
D PTERONYSS IGE QN: <0.1 KU/L
DOG DANDER IGE QN: <0.1 KU/L
ENGL PLANTAIN IGE QN: <0.1 KU/L
HAZELNUT POLN IGE QN: <0.1 KU/L
IGE SERPL-ACNC: 14 IU/ML (ref 6–495)
JOHNSON GRASS IGE QN: <0.1 KU/L
KENT BLUE GRASS IGE QN: <0.1 KU/L
LONDON PLANE IGE QN: <0.1 KU/L
M RACEMOSUS IGE QN: <0.1 KU/L
MT JUNIPER IGE QN: <0.1 KU/L
MUGWORT IGE QN: <0.1 KU/L
NETTLE IGE QN: <0.1 KU/L
P NOTATUM IGE QN: <0.1 KU/L
S BOTRYOSUM IGE QN: <0.1 KU/L
SERVICE CMNT-IMP: NORMAL
SHEEP SORREL IGE QN: <0.1 KU/L
SWEET GUM IGE QN: <0.1 KU/L
WHITE ELM IGE QN: <0.1 KU/L
WHITE HICKORY IGE QN: <0.1 KU/L
WHITE MULBERRY IGE QN: <0.1 KU/L
WHITE OAK IGE QN: <0.1 KU/L